# Patient Record
Sex: MALE | HISPANIC OR LATINO | Employment: FULL TIME | URBAN - METROPOLITAN AREA
[De-identification: names, ages, dates, MRNs, and addresses within clinical notes are randomized per-mention and may not be internally consistent; named-entity substitution may affect disease eponyms.]

---

## 2017-06-30 ENCOUNTER — OFFICE VISIT (OUTPATIENT)
Dept: URGENT CARE | Facility: PHYSICIAN GROUP | Age: 52
End: 2017-06-30
Payer: COMMERCIAL

## 2017-06-30 VITALS
OXYGEN SATURATION: 96 % | HEIGHT: 65 IN | TEMPERATURE: 98.2 F | HEART RATE: 58 BPM | WEIGHT: 146 LBS | BODY MASS INDEX: 24.32 KG/M2 | RESPIRATION RATE: 12 BRPM | SYSTOLIC BLOOD PRESSURE: 128 MMHG | DIASTOLIC BLOOD PRESSURE: 76 MMHG

## 2017-06-30 DIAGNOSIS — S76.311A RIGHT HAMSTRING MUSCLE STRAIN, INITIAL ENCOUNTER: ICD-10-CM

## 2017-06-30 PROCEDURE — 99203 OFFICE O/P NEW LOW 30 MIN: CPT | Performed by: FAMILY MEDICINE

## 2017-06-30 NOTE — MR AVS SNAPSHOT
"        Jamieshelby Powellr   2017 1:15 PM   Office Visit   MRN: [id number]    Department:  [facility]   Dept Phone:  [phone]    Description:  Male : 1965   Provider:  Aidan Núñez M.D.           Reason for Visit     Leg Pain right upper leg injury x 2 days, felt somthing pop in the back of his thigh and now red and painful       Allergies as of 2017     No Known Allergies      You were diagnosed with     Right hamstring muscle strain, initial encounter   [[id number]]         Vital Signs     Blood Pressure Pulse Temperature Respirations Height Weight    128/76 mmHg 58 36.8 °C (98.2 °F) 12 1.651 m (5' 5\") 66.225 kg (146 lb)    Body Mass Index Oxygen Saturation                24.30 kg/m2 96%          Basic Information     Date Of Birth Sex Race Ethnicity Preferred Language    1965 Male  or  Unknown English      Health Maintenance        Date Due Completion Dates    IMM DTaP/Tdap/Td Vaccine (1 - Tdap) 4/15/1985 ---    COLONOSCOPY 4/15/2016 ---            Current Immunizations     No immunizations on file.      Below and/or attached are the medications your provider expects you to take. Review all of your home medications and newly ordered medications with your provider and/or pharmacist. Follow medication instructions as directed by your provider and/or pharmacist. Please keep your medication list with you and share with your provider. Update the information when medications are discontinued, doses are changed, or new medications (including over-the-counter products) are added; and carry medication information at all times in the event of emergency situations     Allergies:  No Known Allergies          Medications  Valid as of: 2017 -  2:16 PM    Generic Name Brand Name Tablet Size Instructions for use    .                 Medicines prescribed today were sent to:     [id number] - [place], [place] - [address]    [address] [place] [address] [place] [address]   " Phone: [phone] Fax: [phone]    Open 24 Hours?: No      Medication refill instructions:       If your prescription bottle indicates you have medication refills left, it is not necessary to call your provider’s office. Please contact your pharmacy and they will refill your medication.    If your prescription bottle indicates you do not have any refills left, you may request refills at any time through one of the following ways: The online [employer] system (except Urgent Care), by calling your provider’s office, or by asking your pharmacy to contact your provider’s office with a refill request. Medication refills are processed only during regular business hours and may not be available until the next business day. Your provider may request additional information or to have a follow-up visit with you prior to refilling your medication.   *Please Note: Medication refills are assigned a new Rx number when refilled electronically. Your pharmacy may indicate that no refills were authorized even though a new prescription for the same medication is available at the pharmacy. Please request the medicine by name with the pharmacy before contacting your provider for a refill.           [employer] Access Code: [email or web address]  Expires: 7/18/2017  4:12 AM    [employer]mium  A secure, online tool to manage your health information     [employer]’s Shopmium® is a secure, online tool that connects you to your personalized health information from the privacy of your home -- day or night - making it very easy for you to manage your healthcare. Once the activation process is completed, you can even access your medical information using the Shopmium mike, which is available for free in the Apple Mike store or Google Play store.     Shopmium provides the following levels of access (as shown below):   My Chart Features   [facility] Doctor [facility]  Specialists [facility]  Urgent  Care [facility]  Primary Care  Doctor   Email your  healthcare team securely and privately 24/7 X X X    Manage appointments: schedule your next appointment; view details of past/upcoming appointments X      Request prescription refills. X      View recent personal medical records, including lab and immunizations X X X X   View health record, including health history, allergies, medications X X X X   Read reports about your outpatient visits, procedures, consult and ER notes X X X X   See your discharge summary, which is a recap of your hospital and/or ER visit that includes your diagnosis, lab results, and care plan. X X       How to register for [employer]:  1. Go to  [email or web address].  2. Click on the Sign Up Now box, which takes you to the New Member Sign Up page. You will need to provide the following information:  a. Enter your [employer] Access Code exactly as it appears at the top of this page. (You will not need to use this code after you’ve completed the sign-up process. If you do not sign up before the expiration date, you must request a new code.)   b. Enter your date of birth.   c. Enter your home email address.   d. Click Submit, and follow the next screen’s instructions.  3. Create a [employer] ID. This will be your [employer] login ID and cannot be changed, so think of one that is secure and easy to remember.  4. Create a [employer] password. You can change your password at any time.  5. Enter your Password Reset Question and Answer. This can be used at a later time if you forget your password.   6. Enter your e-mail address. This allows you to receive e-mail notifications when new information is available in [employer].  7. Click Sign Up. You can now view your health information.    For assistance activating your [employer] account, call [phone]        Quit Tobacco Information     Do you want to quit using tobacco?    Quitting tobacco decreases risks of cancer, heart and lung disease, increases life expectancy, improves sense of taste and smell, and  increases spending money, among other benefits.    If you are thinking about quitting, we can help.  • [facility] Quit Tobacco Program: 826.675.1318  o Program occurs weekly for four weeks and includes pharmacist consultation on products to support quitting smoking or chewing tobacco. A provider referral is needed for pharmacist consultation.  • Tobacco Users Help Hotline: 5-170-QUIT-NOW (654-4416) or https://[place].quitlogix.org/  o Free, confidential telephone and online coaching for [place] residents. Sessions are designed on a schedule that is convenient for you. Eligible clients receive free nicotine replacement therapy.  • Nationally: www.smokefree.gov  o Information and professional assistance to support both immediate and long-term needs as you become, and remain, a non-smoker. Smokefree.gov allows you to choose the help that best fits your needs.

## 2017-06-30 NOTE — PROGRESS NOTES
Chief Complaint:    Chief Complaint   Patient presents with   • Leg Pain     right upper leg injury x 2 days, felt somthing pop in the back of his thigh and now red and painful        History of Present Illness:    This is a new problem. Has pain in right mid hamstring region. On 6/28/17, he was using a satish to move objects. He felt a pop and pain in right mid hamstring region. There is large bruising now in inferior aspect of right hamstring and right posterior knee region. He put ice to area and took Tylenol. Reports pain is improved today compared to yesterday. Pain can be as high as 6/10 severity today whereas yesterday it could be 8/10 severity. He thought he was OK but son wanted him to be seen.      Review of Systems:    Constitutional: Negative for fever, chills, and diaphoresis.   Eyes: Negative for change in vision, photophobia, pain, redness, and discharge.  ENT: Negative for ear pain, ear discharge, hearing loss, tinnitus, nasal congestion, nosebleeds, and sore throat.    Respiratory: Negative for cough, hemoptysis, sputum production, shortness of breath, wheezing, and stridor.    Cardiovascular: Negative for chest pain, palpitations, orthopnea, claudication, leg swelling, and PND.   Gastrointestinal: Negative for abdominal pain, nausea, vomiting, diarrhea, constipation, blood in stool, and melena.    Musculoskeletal: See HPI.   Skin: Negative for rash and itching.   Neurological: Negative for dizziness, tingling, tremors, sensory change, speech change, focal weakness, seizures, loss of consciousness, and headaches.        Past Medical History:    History reviewed. No pertinent past medical history.    Past Surgical History:    Past Surgical History   Procedure Laterality Date   • Vasectomy         Social History:    Social History     Social History   • Marital Status:      Spouse Name: N/A   • Number of Children: 2   • Years of Education: N/A     Occupational History   • technician [employer]     Social History Main Topics   • Smoking status: Current Every Day Smoker   • Smokeless tobacco: Not on file      Comment: 1-2 cig/day since age 25    • Alcohol Use: Yes      Comment: 6-10 beers weekends   • Drug Use: No   • Sexual Activity:     Partners: Female     Other Topics Concern   • Not on file     Social History Narrative       Family History:    Family History   Problem Relation Age of Onset   • Hyperlipidemia Mother    • Clotting Disorder Mother      DVT leg   • Heart Disease Father      MI at age 66, s/p CABG   • Diabetes Father    • Hyperlipidemia Father        Medications:    No current outpatient prescriptions on file prior to visit.     No current facility-administered medications on file prior to visit.       Allergies:    No Known Allergies      Vitals:    Filed Vitals:    06/30/17 1339   BP: 128/76   Pulse: 58   Temp: 36.8 °C (98.2 °F)   Resp: 12   Height: 1.651 m (5' 5\")   Weight: 66.225 kg (146 lb)   SpO2: 96%       Physical Exam:    Constitutional: Vital signs reviewed. Appears well-developed and well-nourished. No acute distress.   Eyes: Sclera white, conjunctivae clear.  ENT: External ears normal. Hearing normal.  Cardiovascular: Peripheral pulses 2+.   Pulmonary/Chest: Respirations non-labored.  Musculoskeletal: Large bruising right inferior hamstring region and right posterior knee region. Normal gait. Normal range of motion. No tenderness to palpation. No muscular atrophy or weakness. Good strength throughout right leg, but hurts in right hamstring on resisted flexion at right knee.  Neurological: Alert and oriented to person, place, and time. Muscle tone normal. Coordination normal. Light touch and sensation normal.   Skin: No rashes or lesions. Warm, dry, normal turgor.      Assessment / Plan:    1. Right hamstring muscle strain, initial encounter      Discussed with him DDX and management options.    Hamstring Strain info given and discussed.    This can " "be managed conservatively as he still has normal range of motion and strength throughout and pain is improved today compared to yesterday.    Bruising will slowly self-resolve.    Recommended relative rest.     May use ice intermittently to affected area prn pain and swelling.    6\" ACE wrap provided to use prn pain and swelling.    May take over-the-counter Ibuprofen (Motrin or Advil) OR Naproxen (Aleve) as needed for pain and swelling for anti-inflammatory effect.    May take over-the-counter Acetaminophen (Tylenol) as needed for pain.    Follow-up with PCP or urgent care if getting worse or not better with time and above.      "

## 2019-08-06 ENCOUNTER — HOSPITAL ENCOUNTER (EMERGENCY)
Facility: MEDICAL CENTER | Age: 54
End: 2019-08-06
Attending: EMERGENCY MEDICINE
Payer: COMMERCIAL

## 2019-08-06 VITALS
BODY MASS INDEX: 23.49 KG/M2 | WEIGHT: 141 LBS | DIASTOLIC BLOOD PRESSURE: 76 MMHG | RESPIRATION RATE: 16 BRPM | SYSTOLIC BLOOD PRESSURE: 131 MMHG | HEART RATE: 74 BPM | HEIGHT: 65 IN

## 2019-08-06 DIAGNOSIS — F43.21 SITUATIONAL DEPRESSION: ICD-10-CM

## 2019-08-06 LAB — POC BREATHALIZER: 0 PERCENT (ref 0–0.01)

## 2019-08-06 PROCEDURE — 99285 EMERGENCY DEPT VISIT HI MDM: CPT

## 2019-08-06 PROCEDURE — 302970 POC BREATHALIZER: Performed by: EMERGENCY MEDICINE

## 2019-08-06 PROCEDURE — 90791 PSYCH DIAGNOSTIC EVALUATION: CPT

## 2019-08-06 NOTE — ED PROVIDER NOTES
ED Provider Note    Scribed for Zoe Morris M.D. by Rafiq Iverson. 8/6/2019, 12:04 PM.    Primary care provider: Jas Knutson M.D.  Means of arrival: EMS  History obtained from: patient  History limited by: none    CHIEF COMPLAINT  Chief Complaint   Patient presents with   • Suicidal Ideation     pt was in an argument with his wife. pt pulled out his gun case and said that he was going to shoot himself. per EMS he didnt open the case. [place] [employer] arrived and stated he should go to the ED for an eval. Not on a Legal 2000 from police. pt states it was just something he did in the moment. curently denies SI or HI.        HPI  Jamie Figueroa is a 54 y.o. male who presents to the Emergency Department for evaluation of suicidal ideation and complaining of depression. Patient is reported to have gotten into an argument with his wife over possible infidelity when he pulled out his gun case and stated that he was going to shoot himself. Police and EMS were called and the patient was recommended to come to the ED for a psych evaluation, but is not on a legal hold at this time. Patient states that he does not currently have suicidal ideation and does not have a plan to complete a suicide attempt. He states that he is looking forward to returning home to his family and his business and has already been told by his wife that she is willing to forgive him. Patient denies homicidal ideation, drug use, history of depression, history of suicidal ideation.  Patient states pulling at the gun was very impulsive.  He has not had any previous episodes of suicidal thoughts or ideation and he has no homicidal ideation    REVIEW OF SYSTEMS  Pertinent positives include suicidal ideation, depression. Pertinent negatives include no homicidal ideation, drug use, history of depression, history of suicidal ideation. All other systems reviewed and negative.     PAST MEDICAL HISTORY   No history of suicidal ideation    SURGICAL  "HISTORY   has a past surgical history that includes vasectomy.    SOCIAL HISTORY  Social History     Tobacco Use   • Smoking status: Current Every Day Smoker   • Smokeless tobacco: Never Used   • Tobacco comment: 1-2 cig/day since age 25    Substance Use Topics   • Alcohol use: Yes     Comment: 6-10 beers weekends   • Drug use: No      Social History     Substance and Sexual Activity   Drug Use No       FAMILY HISTORY  Family History   Problem Relation Age of Onset   • Hyperlipidemia Mother    • Clotting Disorder Mother         DVT leg   • Heart Disease Father         MI at age 66, s/p CABG   • Diabetes Father    • Hyperlipidemia Father        CURRENT MEDICATIONS  Home Medications     Reviewed by Jose Bryant R.N. (Registered Nurse) on 08/06/19 at 1156  Med List Status: Not Addressed   Medication Last Dose Status        Patient Andre Taking any Medications                       ALLERGIES  No Known Allergies    PHYSICAL EXAM  VITAL SIGNS: /90   Pulse 79   Resp 16   Ht 1.651 m (5' 5\")   Wt 64 kg (141 lb)   BMI 23.46 kg/m²     Constitutional:  Alert. Speaking in full sentences.   HENT: Normocephalic, Bilateral external ears normal. Nose normal.   Eyes: Pupils are equal and reactive. Conjunctiva normal, non-icteric.   Thorax & Lungs: Easy unlabored respirations  Abdomen:  No gross signs of peritonitis, no pain with movement   Skin: Visualized skin is  Dry, No erythema, No rash.   Extremities:   No edema, No asymmetry  Neurologic: Alert, Grossly non-focal.   Psychiatric: Anxious, tearful. Patient reports nosuicidal ideation.   Cardiovascular: Normal rate and regular rhythm.                                                   DIAGNOSTIC STUDIES / PROCEDURES\    LABS  Results for orders placed or performed during the hospital encounter of 08/06/19   POC BREATHALIZER   Result Value Ref Range    POC Breathalizer 0.001 0.00 - 0.01 Percent   All labs reviewed by me.    COURSE & MEDICAL DECISION MAKING  Nursing " "notes, VS, PMSFHx reviewed in chart.     Patient presents for evaluation after expressing some suicidal thoughts to his wife.  Patient states his action earlier today was extremely spontaneous.  He denies having any suicidal or homicidal ideation.  He has no history of suicidal or homicidal ideations in the past.  He has no history of psychiatric problems or depression.  Patient is upset here in the ER which seems appropriate.  He is tearful about the problems he is having with his wife.  However he is very forward thinking.  He is worried about his business as well as his daughter and granddaughter plan at this time is to obtain a psychiatric evaluation.    12:04 PM Patient seen and examined at bedside. Ordered for POC breathalyzer, Urine drug screen to evaluate.     1:26 PM - Patient has been consulted with [facility] who has cleared them for discharge. Patient's suicidal ideation is resolved at this time and a good plan has been established for outpatient care.  He is able to contract for safety.    1:50 PM - Recheck: Patient re-evaluated at beside. Patient reports feeling improved and continues to not have SI. Discussed patient's condition and treatment plan. Patient will be discharged with instructions and provided with strict return precautions. He is looking forward to returning home and reconciling with his wife. Patient has been provided with these written instructions and strict return precautions for any new or worsening symptoms. They are agreeable to this course of treatment and discharge.     Discharge vitals: /76   Pulse 74   Resp 16   Ht 1.651 m (5' 5\")   Wt 64 kg (141 lb)   BMI 23.46 kg/m²       The patient will return for new or worsening symptoms and is stable at the time of discharge.    The patient is referred to a primary physician for blood pressure management, diabetic screening, and for all other preventative health concerns.    DISPOSITION:  Patient will be discharged home in " stable condition.    FOLLOW UP:  Jas Knutson M.D.  [address]  [place] [address] [phone]    In 2 days  If symptoms worsen, return to the er.      FINAL IMPRESSION  1. Situational depression          Rafiq HOLLEY (Scribe), am scribing for, and in the presence of, Zoe Morris M.D..    Electronically signed by: Rafiq Iverson (Scribe), 8/6/2019    IZoe M.D. personally performed the services described in this documentation, as scribed by Rafqi Iverson in my presence, and it is both accurate and complete. C    The note accurately reflects work and decisions made by me.  Zoe Morris  8/6/2019  4:33 PM

## 2019-08-06 NOTE — DISCHARGE INSTRUCTIONS
If you have any new or different symptoms or concerns return to the ER.  Try to follow-up with your doctor or a therapist for further help.

## 2019-08-06 NOTE — ED NOTES
Chief Complaint   Patient presents with   • Suicidal Ideation     pt was in an argument with his wife. pt pulled out his gun case and said that he was going to shoot himself. per EMS he didnt open the case. [facility] arrived and stated he should go to the ED for an eval. Not on a Legal 2000 from police. pt states it was just something he did in the moment. curently denies SI or HI.      Pt changed into hospital gown. Belongings at nurses station. Pt currently denies SI and HI. Chart up for ERP.

## 2019-08-06 NOTE — CONSULTS
"[facility]   TRIAGE ASSESSMENT    Name: Jamie Figueroa  MRN: [id number]  : 1965  Age: 54 y.o.  Date of assessment: 2019  PCP: Jas Knutson M.D.  Persons in attendance: Patient    CHIEF COMPLAINT/PRESENTING ISSUE (as stated by pt): see below   Chief Complaint   Patient presents with   • Suicidal Ideation     pt was in an argument with his wife. pt pulled out his gun case and said that he was going to shoot himself. per EMS he didnt open the case. [place] [employer] arrived and stated he should go to the ED for an eval. Not on a Legal 2000 from police. pt states it was just something he did in the moment. curently denies SI or HI.       Pt denies any further SI; denies HI and hallucinations.  A+Ox4 and legally able to care for self.  He reports his wife thinks he is cheating d/t finding texts between him and another woman.  Pt says it is platonic, but wife did not know about it and was shocked.  Pt reports couple's conflicts in general, though they have been  27 years.  He says she is hard to talk to, leading him to talk to a stranger via text.  \"When we talk, I can forget about my finances, my relationship issues, my stress.\"        CURRENT LIVING SITUATION/SOCIAL SUPPORT: Pt lives with his wife and 2 grown children.  He reports adequate support between family and friends.    BEHAVIORAL HEALTH TREATMENT HISTORY  Does patient/parent report a history of prior behavioral health treatment for patient?   No:    SAFETY ASSESSMENT - SELF  Does patient acknowledge current or past symptoms of dangerousness to self? Yes, pt acknowledges voicing SI earlier today while arguing with his wife.  He denies any continuing SI and contracts for safety.  Does parent/significant other report patient has current or past symptoms of dangerousness to self? N\A  Does presenting problem suggest symptoms of dangerousness to self? No    SAFETY ASSESSMENT - OTHERS  Does patient acknowledge current or past symptoms " "of aggressive behavior or risk to others? no  Does parent/significant other report patient has current or past symptoms of aggressive behavior or risk to others?  N\A  Does presenting problem suggest symptoms of dangerousness to others? No    Crisis Safety Plan completed and copy given to patient? yes    ABUSE/NEGLECT SCREENING  Does patient report feeling “unsafe” in his/her home, or afraid of anyone?  no  Does patient report any history of physical, sexual, or emotional abuse?  no  Does parent or significant other report any of the above? no  Is there evidence of neglect by self?  no  Is there evidence of neglect by a caregiver? no  Does the patient/parent report any history of CPS/APS/police involvement related to suspected abuse/neglect or domestic violence? no  Based on the information provided during the current assessment, is a mandated report of suspected abuse/neglect being made?  No    SUBSTANCE USE SCREENING  Yes:  Dar all substances used in the past 30 days:      Last Use Amount   [x]   Alcohol 2 days ago Usually 2 nights a week   []   Marijuana     []   Heroin     []   Prescription Opioids  (used without prescription, for    recreation, or in excess of prescribed amount)     []   Other Prescription  (used without prescription, for    recreation, or in excess of prescribed amount)     []   Cocaine      []   Methamphetamine     []   \"\" drugs (ectasy, MDMA)     []   Other substances        UDS results: none  Breathalyzer results: 0.0    What consequences does the patient associate with any of the above substance use and or addictive behaviors? None    Risk factors for detox (check all that apply):  []  Seizures   []  Diaphoretic (sweating)   []  Tremors   []  Hallucinations   []  Increased blood pressure   []  Decreased blood pressure   []  Other   []  None      [] Patient education on risk factors for detoxification and instructed to return to ER as needed.      MENTAL STATUS   Participation: " Active verbal participation, Attentive, Engaged and Open to feedback  Grooming: Casual  Orientation: Alert and Fully Oriented  Behavior: Calm  Eye contact: Good  Mood: Depressed  Affect: Flexible, Full range, Congruent with content and Sad  Thought process: Logical and Goal-directed  Thought content: Within normal limits  Speech: Rate within normal limits and Volume within normal limits  Perception: Within normal limits  Memory:  No gross evidence of memory deficits  Insight: Adequate  Judgment:  Adequate  Other:    Collateral information: no past psych hx in our EMR  Source:  [] Significant other present in person:   [] Significant other by telephone  [] [facility]   [] [facility] Nursing Staff  [x] [facility]  [] Other:     [] Unable to complete full assessment due to:  [] Acute intoxication  [] Patient declined to participate/engage  [] Patient verbally unresponsive  [] Significant cognitive deficits  [] Significant perceptual distortions or behavioral disorganization  [] Other:      CLINICAL IMPRESSIONS:  Primary:  SI-resolved  Secondary:  Couple's conflict       IDENTIFIED NEEDS/PLAN:  [Trigger DISPOSITION list for any items marked]    []  Imminent safety risk - self [] Imminent safety risk - others   []  Acute substance withdrawal []  Psychosis/Impaired reality testing   [x]  Mood/anxiety []  Substance use/Addictive behavior   []  Maladaptive behaviro []  Parent/child conflict   [x]  Family/Couples conflict []  Biomedical   []  Housing []  Financial   []   Legal  Occupational/Educational   []  Domestic violence []  Other:     Disposition: Refer to therapy and psychiatry as needed.  Provided resource list and highlighted the different options available with his insurance.    Does patient express agreement with the above plan? yes    Referral appointment(s) scheduled? no    Alert team only: Pt to DC to home.  Denies SI, HI and hallucinations; able to care for self.  He is able to list multiple  reasons to live, including his children and his first grandchild on the way.  He exhibits future thinking.  Contracts for safety and advised to return immediately to ER should he feel unable to stay safe.  I have discussed findings and recommendations with Dr. Brown, who is in agreement with these recommendations.       Cristina Encinas R.N.  8/6/2019

## 2021-04-14 ENCOUNTER — IMMUNIZATION (OUTPATIENT)
Dept: FAMILY PLANNING/WOMEN'S HEALTH CLINIC | Facility: IMMUNIZATION CENTER | Age: 56
End: 2021-04-14
Payer: COMMERCIAL

## 2021-04-14 DIAGNOSIS — Z23 ENCOUNTER FOR VACCINATION: Primary | ICD-10-CM

## 2021-04-14 PROCEDURE — 91300 PFIZER SARS-COV-2 VACCINE: CPT

## 2021-04-14 PROCEDURE — 0001A PFIZER SARS-COV-2 VACCINE: CPT

## 2021-05-14 ENCOUNTER — IMMUNIZATION (OUTPATIENT)
Dept: FAMILY PLANNING/WOMEN'S HEALTH CLINIC | Facility: IMMUNIZATION CENTER | Age: 56
End: 2021-05-14
Attending: INTERNAL MEDICINE
Payer: COMMERCIAL

## 2021-05-14 DIAGNOSIS — Z23 ENCOUNTER FOR VACCINATION: Primary | ICD-10-CM

## 2021-05-14 PROCEDURE — 0002A PFIZER SARS-COV-2 VACCINE: CPT

## 2021-05-14 PROCEDURE — 91300 PFIZER SARS-COV-2 VACCINE: CPT

## 2022-11-08 PROCEDURE — 99291 CRITICAL CARE FIRST HOUR: CPT

## 2022-11-08 PROCEDURE — 36415 COLL VENOUS BLD VENIPUNCTURE: CPT

## 2022-11-08 ASSESSMENT — PAIN DESCRIPTION - DESCRIPTORS: DESCRIPTORS: PRESSURE

## 2022-11-09 ENCOUNTER — ANESTHESIA (OUTPATIENT)
Dept: SURGERY | Facility: MEDICAL CENTER | Age: 57
DRG: 418 | End: 2022-11-09
Payer: COMMERCIAL

## 2022-11-09 ENCOUNTER — HOSPITAL ENCOUNTER (INPATIENT)
Facility: MEDICAL CENTER | Age: 57
LOS: 1 days | DRG: 418 | End: 2022-11-10
Attending: EMERGENCY MEDICINE | Admitting: SURGERY
Payer: COMMERCIAL

## 2022-11-09 ENCOUNTER — ANESTHESIA EVENT (OUTPATIENT)
Dept: SURGERY | Facility: MEDICAL CENTER | Age: 57
DRG: 418 | End: 2022-11-09
Payer: COMMERCIAL

## 2022-11-09 ENCOUNTER — APPOINTMENT (OUTPATIENT)
Dept: RADIOLOGY | Facility: MEDICAL CENTER | Age: 57
DRG: 418 | End: 2022-11-09
Attending: EMERGENCY MEDICINE
Payer: COMMERCIAL

## 2022-11-09 DIAGNOSIS — K81.0 ACUTE CHOLECYSTITIS: ICD-10-CM

## 2022-11-09 PROBLEM — K81.9 CHOLECYSTITIS: Status: ACTIVE | Noted: 2022-11-09

## 2022-11-09 LAB
ALBUMIN SERPL BCP-MCNC: 4.9 G/DL (ref 3.2–4.9)
ALBUMIN/GLOB SERPL: 1.5 G/DL
ALP SERPL-CCNC: 98 U/L (ref 30–99)
ALT SERPL-CCNC: 29 U/L (ref 2–50)
ANION GAP SERPL CALC-SCNC: 14 MMOL/L (ref 7–16)
APPEARANCE UR: CLEAR
AST SERPL-CCNC: 27 U/L (ref 12–45)
BASOPHILS # BLD AUTO: 0.3 % (ref 0–1.8)
BASOPHILS # BLD: 0.04 K/UL (ref 0–0.12)
BILIRUB SERPL-MCNC: 0.6 MG/DL (ref 0.1–1.5)
BILIRUB UR QL STRIP.AUTO: NEGATIVE
BUN SERPL-MCNC: 13 MG/DL (ref 8–22)
CALCIUM SERPL-MCNC: 9.6 MG/DL (ref 8.5–10.5)
CHLORIDE SERPL-SCNC: 98 MMOL/L (ref 96–112)
CO2 SERPL-SCNC: 25 MMOL/L (ref 20–33)
COLOR UR: YELLOW
CREAT SERPL-MCNC: 0.62 MG/DL (ref 0.5–1.4)
EOSINOPHIL # BLD AUTO: 0.02 K/UL (ref 0–0.51)
EOSINOPHIL NFR BLD: 0.2 % (ref 0–6.9)
ERYTHROCYTE [DISTWIDTH] IN BLOOD BY AUTOMATED COUNT: 40.7 FL (ref 35.9–50)
GFR SERPLBLD CREATININE-BSD FMLA CKD-EPI: 112 ML/MIN/1.73 M 2
GLOBULIN SER CALC-MCNC: 3.2 G/DL (ref 1.9–3.5)
GLUCOSE SERPL-MCNC: 119 MG/DL (ref 65–99)
GLUCOSE UR STRIP.AUTO-MCNC: NEGATIVE MG/DL
HCT VFR BLD AUTO: 47.8 % (ref 42–52)
HGB BLD-MCNC: 16.7 G/DL (ref 14–18)
IMM GRANULOCYTES # BLD AUTO: 0.06 K/UL (ref 0–0.11)
IMM GRANULOCYTES NFR BLD AUTO: 0.5 % (ref 0–0.9)
KETONES UR STRIP.AUTO-MCNC: 15 MG/DL
LEUKOCYTE ESTERASE UR QL STRIP.AUTO: NEGATIVE
LIPASE SERPL-CCNC: 23 U/L (ref 11–82)
LYMPHOCYTES # BLD AUTO: 0.99 K/UL (ref 1–4.8)
LYMPHOCYTES NFR BLD: 8.1 % (ref 22–41)
MCH RBC QN AUTO: 31.2 PG (ref 27–33)
MCHC RBC AUTO-ENTMCNC: 34.9 G/DL (ref 33.7–35.3)
MCV RBC AUTO: 89.3 FL (ref 81.4–97.8)
MICRO URNS: ABNORMAL
MONOCYTES # BLD AUTO: 0.49 K/UL (ref 0–0.85)
MONOCYTES NFR BLD AUTO: 4 % (ref 0–13.4)
NEUTROPHILS # BLD AUTO: 10.67 K/UL (ref 1.82–7.42)
NEUTROPHILS NFR BLD: 86.9 % (ref 44–72)
NITRITE UR QL STRIP.AUTO: NEGATIVE
NRBC # BLD AUTO: 0 K/UL
NRBC BLD-RTO: 0 /100 WBC
PH UR STRIP.AUTO: 8 [PH] (ref 5–8)
PLATELET # BLD AUTO: 189 K/UL (ref 164–446)
PMV BLD AUTO: 9.4 FL (ref 9–12.9)
POTASSIUM SERPL-SCNC: 4.5 MMOL/L (ref 3.6–5.5)
PROT SERPL-MCNC: 8.1 G/DL (ref 6–8.2)
PROT UR QL STRIP: NEGATIVE MG/DL
RBC # BLD AUTO: 5.35 M/UL (ref 4.7–6.1)
RBC UR QL AUTO: NEGATIVE
SODIUM SERPL-SCNC: 137 MMOL/L (ref 135–145)
SP GR UR STRIP.AUTO: 1.02
UROBILINOGEN UR STRIP.AUTO-MCNC: 1 MG/DL
WBC # BLD AUTO: 12.3 K/UL (ref 4.8–10.8)

## 2022-11-09 PROCEDURE — 47562 LAPAROSCOPIC CHOLECYSTECTOMY: CPT | Performed by: SURGERY

## 2022-11-09 PROCEDURE — 700105 HCHG RX REV CODE 258: Performed by: EMERGENCY MEDICINE

## 2022-11-09 PROCEDURE — 160009 HCHG ANES TIME/MIN: Performed by: SURGERY

## 2022-11-09 PROCEDURE — 96365 THER/PROPH/DIAG IV INF INIT: CPT

## 2022-11-09 PROCEDURE — 700101 HCHG RX REV CODE 250: Performed by: EMERGENCY MEDICINE

## 2022-11-09 PROCEDURE — 81003 URINALYSIS AUTO W/O SCOPE: CPT

## 2022-11-09 PROCEDURE — 0FT44ZZ RESECTION OF GALLBLADDER, PERCUTANEOUS ENDOSCOPIC APPROACH: ICD-10-PCS | Performed by: SURGERY

## 2022-11-09 PROCEDURE — 700111 HCHG RX REV CODE 636 W/ 250 OVERRIDE (IP): Performed by: SURGERY

## 2022-11-09 PROCEDURE — 87040 BLOOD CULTURE FOR BACTERIA: CPT

## 2022-11-09 PROCEDURE — 160041 HCHG SURGERY MINUTES - EA ADDL 1 MIN LEVEL 4: Performed by: SURGERY

## 2022-11-09 PROCEDURE — 160048 HCHG OR STATISTICAL LEVEL 1-5: Performed by: SURGERY

## 2022-11-09 PROCEDURE — 96375 TX/PRO/DX INJ NEW DRUG ADDON: CPT

## 2022-11-09 PROCEDURE — 80053 COMPREHEN METABOLIC PANEL: CPT

## 2022-11-09 PROCEDURE — 93005 ELECTROCARDIOGRAM TRACING: CPT | Performed by: SURGERY

## 2022-11-09 PROCEDURE — 93005 ELECTROCARDIOGRAM TRACING: CPT | Performed by: EMERGENCY MEDICINE

## 2022-11-09 PROCEDURE — 85025 COMPLETE CBC W/AUTO DIFF WBC: CPT

## 2022-11-09 PROCEDURE — 700111 HCHG RX REV CODE 636 W/ 250 OVERRIDE (IP): Performed by: EMERGENCY MEDICINE

## 2022-11-09 PROCEDURE — 99223 1ST HOSP IP/OBS HIGH 75: CPT | Performed by: SURGERY

## 2022-11-09 PROCEDURE — 770001 HCHG ROOM/CARE - MED/SURG/GYN PRIV*

## 2022-11-09 PROCEDURE — A9270 NON-COVERED ITEM OR SERVICE: HCPCS | Performed by: EMERGENCY MEDICINE

## 2022-11-09 PROCEDURE — 160029 HCHG SURGERY MINUTES - 1ST 30 MINS LEVEL 4: Performed by: SURGERY

## 2022-11-09 PROCEDURE — 160036 HCHG PACU - EA ADDL 30 MINS PHASE I: Performed by: SURGERY

## 2022-11-09 PROCEDURE — A9270 NON-COVERED ITEM OR SERVICE: HCPCS | Performed by: SURGERY

## 2022-11-09 PROCEDURE — 700102 HCHG RX REV CODE 250 W/ 637 OVERRIDE(OP): Performed by: SURGERY

## 2022-11-09 PROCEDURE — 160002 HCHG RECOVERY MINUTES (STAT): Performed by: SURGERY

## 2022-11-09 PROCEDURE — 88304 TISSUE EXAM BY PATHOLOGIST: CPT

## 2022-11-09 PROCEDURE — 76705 ECHO EXAM OF ABDOMEN: CPT

## 2022-11-09 PROCEDURE — 36415 COLL VENOUS BLD VENIPUNCTURE: CPT

## 2022-11-09 PROCEDURE — 700102 HCHG RX REV CODE 250 W/ 637 OVERRIDE(OP): Performed by: EMERGENCY MEDICINE

## 2022-11-09 PROCEDURE — 83690 ASSAY OF LIPASE: CPT

## 2022-11-09 PROCEDURE — 00790 ANES IPER UPR ABD NOS: CPT | Performed by: EMERGENCY MEDICINE

## 2022-11-09 PROCEDURE — 160035 HCHG PACU - 1ST 60 MINS PHASE I: Performed by: SURGERY

## 2022-11-09 PROCEDURE — 700101 HCHG RX REV CODE 250: Performed by: SURGERY

## 2022-11-09 RX ORDER — HYDROMORPHONE HYDROCHLORIDE 1 MG/ML
0.1 INJECTION, SOLUTION INTRAMUSCULAR; INTRAVENOUS; SUBCUTANEOUS
Status: DISCONTINUED | OUTPATIENT
Start: 2022-11-09 | End: 2022-11-09 | Stop reason: HOSPADM

## 2022-11-09 RX ORDER — ACETAMINOPHEN 325 MG/1
650 TABLET ORAL EVERY 6 HOURS PRN
Status: DISCONTINUED | OUTPATIENT
Start: 2022-11-14 | End: 2022-11-10 | Stop reason: HOSPADM

## 2022-11-09 RX ORDER — SODIUM CHLORIDE, SODIUM LACTATE, POTASSIUM CHLORIDE, CALCIUM CHLORIDE 600; 310; 30; 20 MG/100ML; MG/100ML; MG/100ML; MG/100ML
INJECTION, SOLUTION INTRAVENOUS
Status: DISCONTINUED | OUTPATIENT
Start: 2022-11-09 | End: 2022-11-09 | Stop reason: SURG

## 2022-11-09 RX ORDER — HYDRALAZINE HYDROCHLORIDE 20 MG/ML
5 INJECTION INTRAMUSCULAR; INTRAVENOUS
Status: DISCONTINUED | OUTPATIENT
Start: 2022-11-09 | End: 2022-11-09 | Stop reason: HOSPADM

## 2022-11-09 RX ORDER — HYDROMORPHONE HYDROCHLORIDE 2 MG/ML
INJECTION, SOLUTION INTRAMUSCULAR; INTRAVENOUS; SUBCUTANEOUS PRN
Status: DISCONTINUED | OUTPATIENT
Start: 2022-11-09 | End: 2022-11-09 | Stop reason: SURG

## 2022-11-09 RX ORDER — ONDANSETRON 2 MG/ML
4 INJECTION INTRAMUSCULAR; INTRAVENOUS
Status: COMPLETED | OUTPATIENT
Start: 2022-11-09 | End: 2022-11-09

## 2022-11-09 RX ORDER — ONDANSETRON 2 MG/ML
4 INJECTION INTRAMUSCULAR; INTRAVENOUS ONCE
Status: COMPLETED | OUTPATIENT
Start: 2022-11-09 | End: 2022-11-09

## 2022-11-09 RX ORDER — DOCUSATE SODIUM 100 MG/1
100 CAPSULE, LIQUID FILLED ORAL 2 TIMES DAILY
Status: DISCONTINUED | OUTPATIENT
Start: 2022-11-09 | End: 2022-11-10 | Stop reason: HOSPADM

## 2022-11-09 RX ORDER — OXYCODONE HYDROCHLORIDE AND ACETAMINOPHEN 5; 325 MG/1; MG/1
2 TABLET ORAL
Status: COMPLETED | OUTPATIENT
Start: 2022-11-09 | End: 2022-11-09

## 2022-11-09 RX ORDER — ONDANSETRON 2 MG/ML
INJECTION INTRAMUSCULAR; INTRAVENOUS PRN
Status: DISCONTINUED | OUTPATIENT
Start: 2022-11-09 | End: 2022-11-09 | Stop reason: SURG

## 2022-11-09 RX ORDER — ACETAMINOPHEN 500 MG
1000 TABLET ORAL
Status: ON HOLD | COMMUNITY
End: 2022-11-10

## 2022-11-09 RX ORDER — ENEMA 19; 7 G/133ML; G/133ML
1 ENEMA RECTAL
Status: DISCONTINUED | OUTPATIENT
Start: 2022-11-09 | End: 2022-11-10 | Stop reason: HOSPADM

## 2022-11-09 RX ORDER — HYDROMORPHONE HYDROCHLORIDE 1 MG/ML
0.4 INJECTION, SOLUTION INTRAMUSCULAR; INTRAVENOUS; SUBCUTANEOUS
Status: DISCONTINUED | OUTPATIENT
Start: 2022-11-09 | End: 2022-11-09 | Stop reason: HOSPADM

## 2022-11-09 RX ORDER — ROCURONIUM BROMIDE 10 MG/ML
INJECTION, SOLUTION INTRAVENOUS PRN
Status: DISCONTINUED | OUTPATIENT
Start: 2022-11-09 | End: 2022-11-09 | Stop reason: SURG

## 2022-11-09 RX ORDER — ACETAMINOPHEN 325 MG/1
650 TABLET ORAL EVERY 6 HOURS
Status: DISCONTINUED | OUTPATIENT
Start: 2022-11-09 | End: 2022-11-10 | Stop reason: HOSPADM

## 2022-11-09 RX ORDER — KETOROLAC TROMETHAMINE 30 MG/ML
15 INJECTION, SOLUTION INTRAMUSCULAR; INTRAVENOUS ONCE
Status: COMPLETED | OUTPATIENT
Start: 2022-11-09 | End: 2022-11-09

## 2022-11-09 RX ORDER — LABETALOL HYDROCHLORIDE 5 MG/ML
5 INJECTION, SOLUTION INTRAVENOUS
Status: DISCONTINUED | OUTPATIENT
Start: 2022-11-09 | End: 2022-11-09 | Stop reason: HOSPADM

## 2022-11-09 RX ORDER — ONDANSETRON 2 MG/ML
4 INJECTION INTRAMUSCULAR; INTRAVENOUS EVERY 4 HOURS PRN
Status: DISCONTINUED | OUTPATIENT
Start: 2022-11-09 | End: 2022-11-10 | Stop reason: HOSPADM

## 2022-11-09 RX ORDER — MIDAZOLAM HYDROCHLORIDE 1 MG/ML
INJECTION INTRAMUSCULAR; INTRAVENOUS PRN
Status: DISCONTINUED | OUTPATIENT
Start: 2022-11-09 | End: 2022-11-09 | Stop reason: SURG

## 2022-11-09 RX ORDER — ALUMINA, MAGNESIA, AND SIMETHICONE 2400; 2400; 240 MG/30ML; MG/30ML; MG/30ML
20 SUSPENSION ORAL ONCE
Status: COMPLETED | OUTPATIENT
Start: 2022-11-09 | End: 2022-11-09

## 2022-11-09 RX ORDER — LIDOCAINE HYDROCHLORIDE 20 MG/ML
INJECTION, SOLUTION EPIDURAL; INFILTRATION; INTRACAUDAL; PERINEURAL PRN
Status: DISCONTINUED | OUTPATIENT
Start: 2022-11-09 | End: 2022-11-09 | Stop reason: SURG

## 2022-11-09 RX ORDER — SODIUM CHLORIDE, SODIUM LACTATE, POTASSIUM CHLORIDE, AND CALCIUM CHLORIDE .6; .31; .03; .02 G/100ML; G/100ML; G/100ML; G/100ML
500 INJECTION, SOLUTION INTRAVENOUS
Status: DISCONTINUED | OUTPATIENT
Start: 2022-11-09 | End: 2022-11-09 | Stop reason: HOSPADM

## 2022-11-09 RX ORDER — BUPIVACAINE HYDROCHLORIDE AND EPINEPHRINE 5; 5 MG/ML; UG/ML
INJECTION, SOLUTION EPIDURAL; INTRACAUDAL; PERINEURAL
Status: DISCONTINUED | OUTPATIENT
Start: 2022-11-09 | End: 2022-11-09 | Stop reason: HOSPADM

## 2022-11-09 RX ORDER — AMOXICILLIN 250 MG
1 CAPSULE ORAL NIGHTLY
Status: DISCONTINUED | OUTPATIENT
Start: 2022-11-09 | End: 2022-11-10 | Stop reason: HOSPADM

## 2022-11-09 RX ORDER — AMOXICILLIN 250 MG
1 CAPSULE ORAL
Status: DISCONTINUED | OUTPATIENT
Start: 2022-11-09 | End: 2022-11-10 | Stop reason: HOSPADM

## 2022-11-09 RX ORDER — MEPERIDINE HYDROCHLORIDE 25 MG/ML
6.25 INJECTION INTRAMUSCULAR; INTRAVENOUS; SUBCUTANEOUS
Status: DISCONTINUED | OUTPATIENT
Start: 2022-11-09 | End: 2022-11-09 | Stop reason: HOSPADM

## 2022-11-09 RX ORDER — DEXAMETHASONE SODIUM PHOSPHATE 4 MG/ML
INJECTION, SOLUTION INTRA-ARTICULAR; INTRALESIONAL; INTRAMUSCULAR; INTRAVENOUS; SOFT TISSUE PRN
Status: DISCONTINUED | OUTPATIENT
Start: 2022-11-09 | End: 2022-11-09 | Stop reason: SURG

## 2022-11-09 RX ORDER — DIPHENHYDRAMINE HYDROCHLORIDE 50 MG/ML
12.5 INJECTION INTRAMUSCULAR; INTRAVENOUS
Status: DISCONTINUED | OUTPATIENT
Start: 2022-11-09 | End: 2022-11-09 | Stop reason: HOSPADM

## 2022-11-09 RX ORDER — HYDROMORPHONE HYDROCHLORIDE 1 MG/ML
0.2 INJECTION, SOLUTION INTRAMUSCULAR; INTRAVENOUS; SUBCUTANEOUS
Status: DISCONTINUED | OUTPATIENT
Start: 2022-11-09 | End: 2022-11-09 | Stop reason: HOSPADM

## 2022-11-09 RX ORDER — ONDANSETRON 4 MG/1
4 TABLET, ORALLY DISINTEGRATING ORAL EVERY 4 HOURS PRN
Status: DISCONTINUED | OUTPATIENT
Start: 2022-11-09 | End: 2022-11-10 | Stop reason: HOSPADM

## 2022-11-09 RX ORDER — HYDROMORPHONE HYDROCHLORIDE 1 MG/ML
0.5 INJECTION, SOLUTION INTRAMUSCULAR; INTRAVENOUS; SUBCUTANEOUS
Status: DISCONTINUED | OUTPATIENT
Start: 2022-11-09 | End: 2022-11-10 | Stop reason: HOSPADM

## 2022-11-09 RX ORDER — ENOXAPARIN SODIUM 100 MG/ML
40 INJECTION SUBCUTANEOUS DAILY
Status: DISCONTINUED | OUTPATIENT
Start: 2022-11-10 | End: 2022-11-10 | Stop reason: HOSPADM

## 2022-11-09 RX ORDER — METRONIDAZOLE 500 MG/100ML
500 INJECTION, SOLUTION INTRAVENOUS EVERY 8 HOURS
Status: DISCONTINUED | OUTPATIENT
Start: 2022-11-09 | End: 2022-11-10 | Stop reason: HOSPADM

## 2022-11-09 RX ORDER — OXYCODONE HYDROCHLORIDE AND ACETAMINOPHEN 5; 325 MG/1; MG/1
1 TABLET ORAL
Status: COMPLETED | OUTPATIENT
Start: 2022-11-09 | End: 2022-11-09

## 2022-11-09 RX ORDER — OXYCODONE HYDROCHLORIDE 5 MG/1
10 TABLET ORAL
Status: DISCONTINUED | OUTPATIENT
Start: 2022-11-09 | End: 2022-11-10 | Stop reason: HOSPADM

## 2022-11-09 RX ORDER — CEFAZOLIN SODIUM 1 G/3ML
INJECTION, POWDER, FOR SOLUTION INTRAMUSCULAR; INTRAVENOUS PRN
Status: DISCONTINUED | OUTPATIENT
Start: 2022-11-09 | End: 2022-11-09 | Stop reason: SURG

## 2022-11-09 RX ORDER — POLYETHYLENE GLYCOL 3350 17 G/17G
1 POWDER, FOR SOLUTION ORAL 2 TIMES DAILY
Status: DISCONTINUED | OUTPATIENT
Start: 2022-11-09 | End: 2022-11-10 | Stop reason: HOSPADM

## 2022-11-09 RX ORDER — HALOPERIDOL 5 MG/ML
1 INJECTION INTRAMUSCULAR
Status: DISCONTINUED | OUTPATIENT
Start: 2022-11-09 | End: 2022-11-09 | Stop reason: HOSPADM

## 2022-11-09 RX ORDER — OXYCODONE HYDROCHLORIDE 5 MG/1
5 TABLET ORAL
Status: DISCONTINUED | OUTPATIENT
Start: 2022-11-09 | End: 2022-11-10 | Stop reason: HOSPADM

## 2022-11-09 RX ORDER — BISACODYL 10 MG
10 SUPPOSITORY, RECTAL RECTAL
Status: DISCONTINUED | OUTPATIENT
Start: 2022-11-09 | End: 2022-11-10 | Stop reason: HOSPADM

## 2022-11-09 RX ADMIN — MAGNESIUM HYDROXIDE 30 ML: 400 SUSPENSION ORAL at 16:09

## 2022-11-09 RX ADMIN — LIDOCAINE HYDROCHLORIDE 100 MG: 20 INJECTION, SOLUTION EPIDURAL; INFILTRATION; INTRACAUDAL at 09:13

## 2022-11-09 RX ADMIN — ONDANSETRON HYDROCHLORIDE 4 MG: 2 SOLUTION INTRAMUSCULAR; INTRAVENOUS at 02:51

## 2022-11-09 RX ADMIN — ALUMINUM HYDROXIDE, MAGNESIUM HYDROXIDE, AND DIMETHICONE 20 ML: 400; 400; 40 SUSPENSION ORAL at 02:45

## 2022-11-09 RX ADMIN — DOCUSATE SODIUM 100 MG: 100 CAPSULE, LIQUID FILLED ORAL at 16:09

## 2022-11-09 RX ADMIN — MIDAZOLAM HYDROCHLORIDE 2 MG: 1 INJECTION, SOLUTION INTRAMUSCULAR; INTRAVENOUS at 09:09

## 2022-11-09 RX ADMIN — METRONIDAZOLE 500 MG: 5 INJECTION, SOLUTION INTRAVENOUS at 16:17

## 2022-11-09 RX ADMIN — ROCURONIUM BROMIDE 10 MG: 10 INJECTION, SOLUTION INTRAVENOUS at 10:19

## 2022-11-09 RX ADMIN — SODIUM CHLORIDE, POTASSIUM CHLORIDE, SODIUM LACTATE AND CALCIUM CHLORIDE: 600; 310; 30; 20 INJECTION, SOLUTION INTRAVENOUS at 09:07

## 2022-11-09 RX ADMIN — KETOROLAC TROMETHAMINE 15 MG: 30 INJECTION, SOLUTION INTRAMUSCULAR; INTRAVENOUS at 02:51

## 2022-11-09 RX ADMIN — FENTANYL CITRATE 100 MCG: 50 INJECTION, SOLUTION INTRAMUSCULAR; INTRAVENOUS at 09:13

## 2022-11-09 RX ADMIN — SUGAMMADEX 200 MG: 100 INJECTION, SOLUTION INTRAVENOUS at 10:33

## 2022-11-09 RX ADMIN — ONDANSETRON 4 MG: 2 INJECTION INTRAMUSCULAR; INTRAVENOUS at 11:07

## 2022-11-09 RX ADMIN — METRONIDAZOLE 500 MG: 5 INJECTION, SOLUTION INTRAVENOUS at 22:02

## 2022-11-09 RX ADMIN — PIPERACILLIN AND TAZOBACTAM 4.5 G: 4; .5 INJECTION, POWDER, LYOPHILIZED, FOR SOLUTION INTRAVENOUS; PARENTERAL at 03:41

## 2022-11-09 RX ADMIN — CEFTRIAXONE SODIUM 1000 MG: 10 INJECTION, POWDER, FOR SOLUTION INTRAVENOUS at 16:09

## 2022-11-09 RX ADMIN — PROPOFOL 120 MG: 10 INJECTION, EMULSION INTRAVENOUS at 09:13

## 2022-11-09 RX ADMIN — DEXAMETHASONE SODIUM PHOSPHATE 4 MG: 4 INJECTION, SOLUTION INTRA-ARTICULAR; INTRALESIONAL; INTRAMUSCULAR; INTRAVENOUS; SOFT TISSUE at 09:25

## 2022-11-09 RX ADMIN — HYDROMORPHONE HYDROCHLORIDE 0.5 MG: 2 INJECTION INTRAMUSCULAR; INTRAVENOUS; SUBCUTANEOUS at 10:34

## 2022-11-09 RX ADMIN — CEFAZOLIN 2 G: 330 INJECTION, POWDER, FOR SOLUTION INTRAMUSCULAR; INTRAVENOUS at 09:20

## 2022-11-09 RX ADMIN — OXYCODONE AND ACETAMINOPHEN 2 TABLET: 5; 325 TABLET ORAL at 11:07

## 2022-11-09 RX ADMIN — ROCURONIUM BROMIDE 50 MG: 10 INJECTION, SOLUTION INTRAVENOUS at 09:13

## 2022-11-09 RX ADMIN — HYDROMORPHONE HYDROCHLORIDE 1 MG: 2 INJECTION INTRAMUSCULAR; INTRAVENOUS; SUBCUTANEOUS at 09:25

## 2022-11-09 RX ADMIN — ONDANSETRON 4 MG: 2 INJECTION INTRAMUSCULAR; INTRAVENOUS at 09:25

## 2022-11-09 RX ADMIN — ACETAMINOPHEN 650 MG: 325 TABLET, FILM COATED ORAL at 16:09

## 2022-11-09 ASSESSMENT — LIFESTYLE VARIABLES
TOTAL SCORE: 0
ON A TYPICAL DAY WHEN YOU DRINK ALCOHOL HOW MANY DRINKS DO YOU HAVE: 6
HOW MANY TIMES IN THE PAST YEAR HAVE YOU HAD 5 OR MORE DRINKS IN A DAY: 150
CONSUMPTION TOTAL: POSITIVE
TOTAL SCORE: 0
ALCOHOL_USE: YES
HAVE YOU EVER FELT YOU SHOULD CUT DOWN ON YOUR DRINKING: NO
EVER FELT BAD OR GUILTY ABOUT YOUR DRINKING: NO
EVER HAD A DRINK FIRST THING IN THE MORNING TO STEADY YOUR NERVES TO GET RID OF A HANGOVER: NO
AVERAGE NUMBER OF DAYS PER WEEK YOU HAVE A DRINK CONTAINING ALCOHOL: 3
DOES PATIENT WANT TO STOP DRINKING: NO
TOTAL SCORE: 0
HAVE PEOPLE ANNOYED YOU BY CRITICIZING YOUR DRINKING: NO

## 2022-11-09 ASSESSMENT — PAIN DESCRIPTION - PAIN TYPE
TYPE: SURGICAL PAIN

## 2022-11-09 ASSESSMENT — COGNITIVE AND FUNCTIONAL STATUS - GENERAL
SUGGESTED CMS G CODE MODIFIER MOBILITY: CH
DAILY ACTIVITIY SCORE: 24
MOBILITY SCORE: 24
SUGGESTED CMS G CODE MODIFIER DAILY ACTIVITY: CH

## 2022-11-09 ASSESSMENT — PATIENT HEALTH QUESTIONNAIRE - PHQ9
1. LITTLE INTEREST OR PLEASURE IN DOING THINGS: NOT AT ALL
2. FEELING DOWN, DEPRESSED, IRRITABLE, OR HOPELESS: NOT AT ALL
SUM OF ALL RESPONSES TO PHQ9 QUESTIONS 1 AND 2: 0

## 2022-11-09 ASSESSMENT — PAIN SCALES - GENERAL: PAIN_LEVEL: 4

## 2022-11-09 NOTE — ED NOTES
Med Rec Complete per patient  Allergies Reviewed with patient  No antibiotics within the last 30 days  Patient's Preferred Pharmacy:  [facility] [address].

## 2022-11-09 NOTE — ANESTHESIA PREPROCEDURE EVALUATION
Case: [id number] Date/Time: 11/09/22 0831    Procedure: CHOLECYSTECTOMY, LAPAROSCOPIC    Location: [facility] OR 14 / SURGERY [facility]    Surgeons: Maximilian Rojas D.O.          Relevant Problems   No relevant active problems       Physical Exam    Airway   Mallampati: II  TM distance: >3 FB  Neck ROM: full       Cardiovascular - normal exam  Rhythm: regular  Rate: abnormal  (-) murmur     Dental - normal exam        Facial Hair   Pulmonary - normal exam  Breath sounds clear to auscultation     Abdominal    Neurological - normal exam                 Anesthesia Plan    ASA 2       Plan - general       Airway plan will be ETT          Induction: intravenous    Postoperative Plan: Postoperative administration of opioids is intended.    Pertinent diagnostic labs and testing reviewed    Informed Consent:    Anesthetic plan and risks discussed with patient.    Use of blood products discussed with: patient whom consented to blood products.

## 2022-11-09 NOTE — ED PROVIDER NOTES
ED Provider Note    Scribed for Aayush Tamez M.D. by Mateo Kee. 11/9/2022,  2:22 AM.    Means of Arrival: walk in  History obtained from: patient  History limited by: none    CHIEF COMPLAINT  Chief Complaint   Patient presents with    Abdominal Pain       HPI  Jamie Figueroa is a 57 y.o. male who presents to the Emergency Department for abdominal pain onset 5 hours ago. He states that he has not had any bowel movements but does not excessive burping. His last bowel movement was yesterday. He localizes his pain to the upper abdomen, under his rib. He notes that upon exertion his pain is increased. He states that his pain has resolved a bit, from a 9 upon arrival to a 7 currently. He notes some associated nausea. He denies any vomiting, fevers, dysuria, or hematuria. He endorses some Pepto Bismol intake for his pain but it has only slightly alleviated his symptoms. He states no medical problems except for some minor shoulder pain. He denies any prior surgeries on his abdomen.    REVIEW OF SYSTEMS  CONSTITUTIONAL:  No fever.  GASTROINTESTINAL:  Abdominal pain and nausea. No vomiting.   GENITOURINARY:   No dysuria or hematuria.  See HPI for further details.   All other systems are negative.     PAST MEDICAL HISTORY  History reviewed. No pertinent past medical history.    FAMILY HISTORY  Family History   Problem Relation Age of Onset    Hyperlipidemia Mother     Clotting Disorder Mother         DVT leg    Heart Disease Father         MI at age 66, s/p CABG    Diabetes Father     Hyperlipidemia Father        SOCIAL HISTORY   reports that he has been smoking. He has never used smokeless tobacco. He reports current alcohol use. He reports that he does not use drugs.    SURGICAL HISTORY  Past Surgical History:   Procedure Laterality Date    VASECTOMY         CURRENT MEDICATIONS  Home Medications       Reviewed by Alison Reddy R.N. (Registered Nurse) on 11/08/22 at 9130  Med List Status: Complete  "    Medication Last Dose Status        Patient Andre Taking any Medications                           ALLERGIES  No Known Allergies    PHYSICAL EXAM   VITAL SIGNS: BP (!) 160/86   Pulse (!) 56   Temp 36.5 °C (97.7 °F) (Temporal)   Resp 17   Ht 1.651 m (5' 5\") Comment: Simultaneous filing. User may not have seen previous data.  Wt 75.3 kg (166 lb 0.1 oz)   SpO2 96%   BMI 27.62 kg/m²    Gen: Alert, no acute distress  HEENT: ATNC  Eyes: PERRL, EOMI, normal conjunctiva.   Neck: trachea midline  Resp: no respiratory distress  CV: No JVD  Abd: Diffuse tenderness greatest in the right upper quadrant, with no rebound or guarding. Mild right CVA tenderness. non-distended  Ext: No deformities  Psych: normal mood  Neuro: speech fluent     DIAGNOSTIC STUDIES / PROCEDURES       LABS  Labs Reviewed   CBC WITH DIFFERENTIAL - Abnormal; Notable for the following components:       Result Value    WBC 12.3 (*)     Neutrophils-Polys 86.90 (*)     Lymphocytes 8.10 (*)     Neutrophils (Absolute) 10.67 (*)     Lymphs (Absolute) 0.99 (*)     All other components within normal limits   COMP METABOLIC PANEL - Abnormal; Notable for the following components:    Glucose 119 (*)     All other components within normal limits   URINALYSIS - Abnormal; Notable for the following components:    Ketones 15 (*)     All other components within normal limits   LIPASE   ESTIMATED GFR   BLOOD CULTURE    Narrative:     Per Hospital Policy: Only change Specimen Src: to \"Line\" if  specified by physician order.   BLOOD CULTURE    Narrative:     Per Hospital Policy: Only change Specimen Src: to \"Line\" if  specified by physician order.     All labs reviewed by me.    RADIOLOGY  US-RUQ   Final Result      1.  Acute cholecystitis.   2.  Right hepatic lobe cyst measuring up to 4.5 cm.        The radiologist’s interpretation of all radiology studies have been reviewed by me.    COURSE & MEDICAL DECISION MAKING  Pertinent Labs & Imaging studies reviewed. (See " chart for details)    2:22 AM Patient seen and examined at bedside. Patient will be treated with Toradol 15 mg, Zofran 4 mg and Mylanta DS 20 mL for his symptoms.     3:13 AM - Patient will be treated with Zosyn 4.5 g in 100 mL NS.    3:27 AM - Paged General Surgery.    3:35 AM I discussed the patient's case and the above findings with Dr. Matthews (General Surgery) who will assess the patient for surgery.      Medical Decision Making:  Patient presents with upper abdominal pain, nausea.  He has greatest tenderness in the region of his gallbladder, no history of surgeries.  His labs are reassuring, however the ultrasound is concerning for acute cholecystitis.  This was discussed with general surgery, who will evaluate the patient for surgical intervention.  Patient given empiric antibiotics.  No evidence for ascending cholangitis, pancreatitis.    I reviewed the EKG, no evidence for ACS.  It reports anterior ST elevation but I disagree with the computer read.  I believe that this is artifact.  The EKG did not crossover into the computer system appropriately.    DISPOSITION:  Patient will be hospitalized by Dr. Matthews in guarded condition.    FINAL IMPRESSION  1. Acute cholecystitis      Mateo HOLLEY (Rae), am scribing for, and in the presence of, Aayush Tamez M.D..    Electronically signed by: Mateo Kee (Rae), 11/9/2022    Aayush HOLLEY M.D. personally performed the services described in this documentation, as scribed by Mateo Kee in my presence, and it is both accurate and complete.    The note accurately reflects work and decisions made by me.  Aayush Tamez M.D.  11/9/2022  3:53 AM    This dictation was created using voice recognition software. The accuracy of the dictation is limited to the abilities of the software. I expect there may be some errors of grammar and possibly content. The nursing notes were reviewed and certain aspects of this information were incorporated into this note.

## 2022-11-09 NOTE — ANESTHESIA PROCEDURE NOTES
Airway    Date/Time: 11/9/2022 9:16 AM  Performed by: Neftaly Feliz M.D.  Authorized by: Neftaly Fleiz M.D.     Location:  OR  Urgency:  Elective  Difficult Airway: No    Indications for Airway Management:  Anesthesia      Spontaneous Ventilation: absent    Sedation Level:  Deep  Preoxygenated: Yes    Patient Position:  Sniffing  Mask Difficulty Assessment:  2 - vent by mask + OA or adjuvant +/- NMBA  Final Airway Type:  Endotracheal airway  Final Endotracheal Airway:  ETT  Cuffed: Yes    Technique Used for Successful ETT Placement:  Direct laryngoscopy    Insertion Site:  Oral  Blade Type:  Briceno  Laryngoscope Blade/Videolaryngoscope Blade Size:  2  ETT Size (mm):  8.0  Measured from:  Teeth  ETT to Teeth (cm):  24  Placement Verified by: auscultation and capnometry    Cormack-Lehane Classification:  Grade IIa - partial view of glottis  Number of Attempts at Approach:  1

## 2022-11-09 NOTE — ANESTHESIA TIME REPORT
Anesthesia Start and Stop Event Times     Date Time Event    11/9/2022 0855 Ready for Procedure     0907 Anesthesia Start     1050 Anesthesia Stop        Responsible Staff  11/09/22    Name Role Begin End    Neftaly Feliz M.D. Anesth 0907 1050        Overtime Reason:  no overtime (within assigned shift)    Comments:

## 2022-11-09 NOTE — ANESTHESIA POSTPROCEDURE EVALUATION
Patient: Jamie Figueroa    Procedure Summary     Date: 11/09/22 Room / Location: [place] [address] / SURGERY [facility]    Anesthesia Start: 0907 Anesthesia Stop: 1050    Procedure: CHOLECYSTECTOMY, LAPAROSCOPIC (Abdomen) Diagnosis: (ACUTE CHOLECYSTITIS)    Surgeons: Maximilian Rojas D.O. Responsible Provider: Neftaly Feliz M.D.    Anesthesia Type: general ASA Status: 2          Final Anesthesia Type: general  Last vitals  BP   Blood Pressure: (!) 142/71    Temp   36.1 °C (96.9 °F)    Pulse   (!) 50   Resp   16    SpO2   93 %      Anesthesia Post Evaluation    Patient location during evaluation: PACU  Patient participation: complete - patient participated  Level of consciousness: awake and alert  Pain score: 4    Airway patency: patent  Anesthetic complications: no  Cardiovascular status: hemodynamically stable  Respiratory status: acceptable  Hydration status: euvolemic    PONV: none          No notable events documented.     Nurse Pain Score: 8 (NPRS)

## 2022-11-09 NOTE — OR NURSING
1048 Pt arrived from OR via [facility]. Report given by anesthesia and RN. Sleeping, perrla, opa, 10L mask even non labored breathing, lungs clear airway patent. SB. Skin pink warm dry. PIV patent. Abd soft non distended. X4 lap sites dermabond, well approximated, attached edges, no drainage, no hematoma. Cold pack.     1057 arousable. Gag reflex intact. Opa removed. Spontaneous even non labored breathing.     1107 awake alert, oriented, clear speech, follows commands. C/o  pain and nausea, treated per mar    1145 resting comfortably. Pain and nausea. Resolved. Abd soft non distended. X4 lap sites dermabond, well approximated, attached edges, no drainage, no hematoma. Cold pack.     1200 updated Sunita, spouse. O2 tank full for transfer. Persona belongings with pt    1230 no changes. Waiting for room assignment.     1300 report given to Alecia SUMNER T405. Awake alert, even non labored breathing. Abd no changes

## 2022-11-09 NOTE — ED TRIAGE NOTES
"Chief Complaint   Patient presents with    Abdominal Pain     Patient ambulatory to room with steady gait. Reports that he started to have URQ pain at 1500 today. States that \"he feels like he is about to explode\".   No Bowel movement.   He states that he received his a covid, flu, and shingles vaccination today.   "

## 2022-11-09 NOTE — H&P
"  CHIEF COMPLAINT: acute cholecystitis     HISTORY OF PRESENT ILLNESS: The patient is a 57 year-old  man who presents to the Emergency Department a 12- hour history of moderate to severe epigastric and right upper quadrant abdominal pain. The pain is associated with nausea. He denies any food intolerance or temporal relationship between symptoms and eating. The patient denies any recent or intercurrent illness. The patient denies any history of previous abdominal surgery.    PAST MEDICAL HISTORY:  has no past medical history on file.    PAST SURGICAL HISTORY:  has a past surgical history that includes vasectomy.    ALLERGIES: No Known Allergies    CURRENT MEDICATIONS:    Home Medications       Reviewed by Lacey Arreola (yuilop SL) on 11/09/22 at 0628  Med List Status: Complete     Medication Last Dose Status   acetaminophen (TYLENOL) 500 MG Tab 11/7/2022 Active                FAMILY HISTORY: family history includes Clotting Disorder in his mother; Diabetes in his father; Heart Disease in his father; Hyperlipidemia in his father and mother.    SOCIAL HISTORY:  reports that he has been smoking. He has never used smokeless tobacco. He reports current alcohol use. He reports that he does not use drugs.    REVIEW OF SYSTEMS: Comprehensive review of systems is negative with the exception of the aforementioned HPI, PMH, and PSH bullets in accordance with CMS guidelines.    PHYSICAL EXAMINATION:      Constitutional:     Vital Signs: BP (!) 144/71   Pulse (!) 46   Temp 36.5 °C (97.7 °F) (Temporal)   Resp 16   Ht 1.651 m (5' 5\")   Wt 75.3 kg (166 lb 0.1 oz)   SpO2 94%    General Appearance: Sleeping comfortably, appears stated age, no distress.  HEENT: The pupils are equal, round, and reactive to light bilaterally. The extraocular muscles are intact bilaterally. The sclera are non icteric. Nares and oropharynx are clear.   Neck: Supple. No adenopathy.  Respiratory:   Inspection: Unlabored respirations, " no intercostal retractions, paradoxical motion, or accessory muscle use.   Auscultation: clear to auscultation.  Cardiovascular:   Inspection: The skin is warm, dry, and well purfused.  Auscultation: Sinus bradycardia   Peripheral Pulses: Normal.   Abdomen:  Inspection: Abdominal inspection reveals  no abdominal distension.   Palpation: Palpation is remarkable for moderate tenderness in the right subcostal and right upper quadrant  region.   Extremities:   Examination of the upper and lower extremities demonstrates no cyanosis edema or clubbing.  Neurologic:   Alert & oriented to person, time and place. Normal motor function. Normal sensory function. No focal deficits noted.    LABORATORY VALUES:   Recent Labs     11/09/22 0015   WBC 12.3*   RBC 5.35   HEMOGLOBIN 16.7   HEMATOCRIT 47.8   MCV 89.3   MCH 31.2   MCHC 34.9   RDW 40.7   PLATELETCT 189   MPV 9.4     Recent Labs     11/09/22 0015   SODIUM 137   POTASSIUM 4.5   CHLORIDE 98   CO2 25   GLUCOSE 119*   BUN 13   CREATININE 0.62   CALCIUM 9.6     Recent Labs     11/09/22 0015   ASTSGOT 27   ALTSGPT 29   TBILIRUBIN 0.6   ALKPHOSPHAT 98   GLOBULIN 3.2     IMAGING:   US-RUQ   Final Result      1.  Acute cholecystitis.   2.  Right hepatic lobe cyst measuring up to 4.5 cm.          ASSESSMENT AND PLAN:     Acute cholecystitis.    The patient will be taken to the operating room for laparoscopic cholecystectomy. The surgical conduct was discussed in detail. Potential complications including, but not limited to, infection, bleeding, damage to adjacent structures, bile duct injury, need to convert to an open procedure, and anesthetic complications were discussed. Questions were elicited and answered to the patient's satisfaction.  Operative consent signed.      ____________________________________     Marcio Matthews M.D.    DD: 11/9/2022  3:32 AM

## 2022-11-09 NOTE — ED NOTES
Pt ambulates to [facility] with steady gait, pt complains of RUQ pain with some nausea, pt denies bloody stool or vomiting or R shoulder pain

## 2022-11-10 VITALS
HEIGHT: 65 IN | TEMPERATURE: 97.5 F | BODY MASS INDEX: 27.66 KG/M2 | RESPIRATION RATE: 17 BRPM | WEIGHT: 166.01 LBS | DIASTOLIC BLOOD PRESSURE: 56 MMHG | SYSTOLIC BLOOD PRESSURE: 114 MMHG | OXYGEN SATURATION: 90 % | HEART RATE: 65 BPM

## 2022-11-10 LAB
ALBUMIN SERPL BCP-MCNC: 3.7 G/DL (ref 3.2–4.9)
ALBUMIN/GLOB SERPL: 1.5 G/DL
ALP SERPL-CCNC: 71 U/L (ref 30–99)
ALT SERPL-CCNC: 122 U/L (ref 2–50)
ANION GAP SERPL CALC-SCNC: 9 MMOL/L (ref 7–16)
AST SERPL-CCNC: 92 U/L (ref 12–45)
BASOPHILS # BLD AUTO: 0.1 % (ref 0–1.8)
BASOPHILS # BLD: 0.01 K/UL (ref 0–0.12)
BILIRUB SERPL-MCNC: 0.3 MG/DL (ref 0.1–1.5)
BUN SERPL-MCNC: 13 MG/DL (ref 8–22)
CALCIUM SERPL-MCNC: 8.2 MG/DL (ref 8.5–10.5)
CHLORIDE SERPL-SCNC: 102 MMOL/L (ref 96–112)
CO2 SERPL-SCNC: 26 MMOL/L (ref 20–33)
CREAT SERPL-MCNC: 0.81 MG/DL (ref 0.5–1.4)
EOSINOPHIL # BLD AUTO: 0.01 K/UL (ref 0–0.51)
EOSINOPHIL NFR BLD: 0.1 % (ref 0–6.9)
ERYTHROCYTE [DISTWIDTH] IN BLOOD BY AUTOMATED COUNT: 41.1 FL (ref 35.9–50)
GFR SERPLBLD CREATININE-BSD FMLA CKD-EPI: 103 ML/MIN/1.73 M 2
GLOBULIN SER CALC-MCNC: 2.4 G/DL (ref 1.9–3.5)
GLUCOSE SERPL-MCNC: 151 MG/DL (ref 65–99)
HCT VFR BLD AUTO: 39.6 % (ref 42–52)
HGB BLD-MCNC: 13.6 G/DL (ref 14–18)
IMM GRANULOCYTES # BLD AUTO: 0.04 K/UL (ref 0–0.11)
IMM GRANULOCYTES NFR BLD AUTO: 0.4 % (ref 0–0.9)
LYMPHOCYTES # BLD AUTO: 0.99 K/UL (ref 1–4.8)
LYMPHOCYTES NFR BLD: 10.6 % (ref 22–41)
MAGNESIUM SERPL-MCNC: 2.4 MG/DL (ref 1.5–2.5)
MCH RBC QN AUTO: 31.1 PG (ref 27–33)
MCHC RBC AUTO-ENTMCNC: 34.3 G/DL (ref 33.7–35.3)
MCV RBC AUTO: 90.4 FL (ref 81.4–97.8)
MONOCYTES # BLD AUTO: 0.6 K/UL (ref 0–0.85)
MONOCYTES NFR BLD AUTO: 6.4 % (ref 0–13.4)
NEUTROPHILS # BLD AUTO: 7.66 K/UL (ref 1.82–7.42)
NEUTROPHILS NFR BLD: 82.4 % (ref 44–72)
NRBC # BLD AUTO: 0 K/UL
NRBC BLD-RTO: 0 /100 WBC
PHOSPHATE SERPL-MCNC: 2.4 MG/DL (ref 2.5–4.5)
PLATELET # BLD AUTO: 162 K/UL (ref 164–446)
PMV BLD AUTO: 9.6 FL (ref 9–12.9)
POTASSIUM SERPL-SCNC: 4 MMOL/L (ref 3.6–5.5)
PROT SERPL-MCNC: 6.1 G/DL (ref 6–8.2)
RBC # BLD AUTO: 4.38 M/UL (ref 4.7–6.1)
SODIUM SERPL-SCNC: 137 MMOL/L (ref 135–145)
WBC # BLD AUTO: 9.3 K/UL (ref 4.8–10.8)

## 2022-11-10 PROCEDURE — 700101 HCHG RX REV CODE 250: Performed by: SURGERY

## 2022-11-10 PROCEDURE — 85025 COMPLETE CBC W/AUTO DIFF WBC: CPT

## 2022-11-10 PROCEDURE — 83735 ASSAY OF MAGNESIUM: CPT

## 2022-11-10 PROCEDURE — 700102 HCHG RX REV CODE 250 W/ 637 OVERRIDE(OP): Performed by: SURGERY

## 2022-11-10 PROCEDURE — A9270 NON-COVERED ITEM OR SERVICE: HCPCS | Performed by: SURGERY

## 2022-11-10 PROCEDURE — 80053 COMPREHEN METABOLIC PANEL: CPT

## 2022-11-10 PROCEDURE — 84100 ASSAY OF PHOSPHORUS: CPT

## 2022-11-10 PROCEDURE — 700111 HCHG RX REV CODE 636 W/ 250 OVERRIDE (IP): Performed by: SURGERY

## 2022-11-10 PROCEDURE — 99024 POSTOP FOLLOW-UP VISIT: CPT | Performed by: NURSE PRACTITIONER

## 2022-11-10 RX ORDER — ACETAMINOPHEN 325 MG/1
650 TABLET ORAL EVERY 6 HOURS
Qty: 30 TABLET | Refills: 0 | COMMUNITY
Start: 2022-11-10

## 2022-11-10 RX ADMIN — METRONIDAZOLE 500 MG: 5 INJECTION, SOLUTION INTRAVENOUS at 06:41

## 2022-11-10 RX ADMIN — POLYETHYLENE GLYCOL 3350 1 PACKET: 17 POWDER, FOR SOLUTION ORAL at 06:36

## 2022-11-10 RX ADMIN — MAGNESIUM HYDROXIDE 30 ML: 400 SUSPENSION ORAL at 06:36

## 2022-11-10 RX ADMIN — DOCUSATE SODIUM 100 MG: 100 CAPSULE, LIQUID FILLED ORAL at 06:37

## 2022-11-10 RX ADMIN — CEFTRIAXONE SODIUM 1000 MG: 10 INJECTION, POWDER, FOR SOLUTION INTRAVENOUS at 06:36

## 2022-11-10 RX ADMIN — ACETAMINOPHEN 650 MG: 325 TABLET, FILM COATED ORAL at 06:37

## 2022-11-10 ASSESSMENT — PAIN DESCRIPTION - PAIN TYPE: TYPE: SURGICAL PAIN

## 2022-11-10 NOTE — OP REPORT
Operative Report    PreOp Diagnosis: Acute Cholecystitis       PostOp Diagnosis: Same      Procedure(s):  CHOLECYSTECTOMY, LAPAROSCOPIC - Wound Class: Clean Contaminated    Surgeon(s):  Maximilian Rojas D.O.    Anesthesiologist/Type of Anesthesia:  Anesthesiologist: Neftaly Feliz M.D./General    Surgical Staff:  Circulator: Jaleesa Garland R.N.; Rita Roberts R.N.  Relief Circulator: Anel Coughlin R.N.  Scrub Person: Theodore Santos  First Assist: SIL Aguirre    Specimens removed if any:  ID Type Source Tests Collected by Time Destination   A : Gallbladder and contents Tissue Gallbladder PATHOLOGY SPECIMEN Maximilian Rojas D.O. 11/9/2022 10:06 AM        Estimated Blood Loss: 25cc    Findings: Enlarged intrahepatic gallbladder with large stone. Gallbladder bed with significant inflammatory process and venous congestion.    Complications: none noted    Indications: 57 year old  male with history, physical examination and workup consitnt with acute cholecystitis.    OPERATIVE REPORT: The patient was brought to the operating room and placed in  supine position on the operating table. After adequate general anesthesia,   the abdomen was prepped and draped in standard fashion. An area inferior to the umbilucus was   infiltrated with 0.25% bupivacaine. An incision was made through the skin and  subcutaneous tissues. We then bluntly dissected down to the anterior fascia,  which was elevated into the wound using a Kocher clamp. A stay suture of 0   Vicryl was then placed. The fascia was then incised and we dissected through   the abdominal wall in layers until the peritoneum was entered. We then placed  the Julienne port and insufflated the abdomen. The area in the epigastrium was  chosen for a 5 mm port. The skin and subcutaneous tissue were infiltrated   with 0.25% bupivacaine. A small incision was made and a 5 mm port was   inserted under direct camera observation. Two additional 5 mm ports were    placed in the right lateral abdominal wall using identical technique. I then   grasped the fundus of the gallbladder and retracted it anteriorly and   superiorly. The gallbladder itself was more than 50% intrahepatic The infundibulum was retracted anteriorly and laterally. We then skeletonized the cystic duct, doubly clip distally and singly clipped   proximally and then divided with the endoscopic shear. The cystic artery was   then skeletonized and doubly clipped proximally and singly clipped distally   prior to dividing with the endoscopic shear. The gallbladder was then   dissected free from its fossa. Due to the intrahepatic nature of the gallbladder and the inflammatory process, this was slow work, requiring slow cauter and and, in one case, clipping a large distended vein. When this was completed, we placed in an EndoCatch bag and   retrieved it from the umbilical port site. We then irrigated the gallbladder   fossa in the right upper quadrant until the effluent was clear. There was no   sign of bleeding or bile leakage. The ports were then removed. The fascia at  the umbilical port site was closed using the stay suture placed at the   beginning of the case. The wounds were then irrigated and the skin was closed  using a 4-0 Monocryl stitch in a subcuticular fashion. The area was then   cleaned and dried and dermabond was applied. The patient was then awakened from anesthesia and taken to post-anesthesia care unit in stable condition. The sponge, needle, and   instrument count was correct at the end of the case and I was present for the   entirety of the case.    The nature of the surgical procedure warranted additional skilled operative assistance from an Advanced Registered Nurse Practitioner (ARNP). The assistant was present during the entire operation. The surgical assistant performed the following: provided assistance with optimal surgical exposure of the operative field, provided high complexity,  subspecialty decision making input, operated the camera for the laparoscopic portion of the procedure, assisted with the fascial closure, and performed the skin closure and dressing application.    11/10/2022 7:21 AM Maximilian Rojas D.O.

## 2022-11-10 NOTE — PROGRESS NOTES
D/c instructions given, educated on worsening s/s. Pt understands and questions answered. Iv removed, home with daughter

## 2022-11-10 NOTE — CARE PLAN
Problem: Knowledge Deficit - Standard  Goal: Patient and family/care givers will demonstrate understanding of plan of care, disease process/condition, diagnostic tests and medications  Outcome: Progressing     Problem: Communication  Goal: The ability to communicate needs accurately and effectively will improve  Outcome: Progressing     Problem: Respiratory  Goal: Patient will achieve/maintain optimum respiratory ventilation and gas exchange  Outcome: Progressing     The patient is Stable - Low risk of patient condition declining or worsening    Shift Goals  Clinical Goals: D/C  Patient Goals: rest    Progress made toward(s) clinical / shift goals:  Junaid ash POD #1; pt appears to be doing well. Pt is ambulating, no supp. O2 required, minimal c/o pain, no n+v, tolerating PO    Patient is not progressing towards the following goals:

## 2022-11-10 NOTE — CARE PLAN
The patient is Stable - Low risk of patient condition declining or worsening    Shift Goals  Clinical Goals: pain control, ambulate  Patient Goals: rest    Progress made toward(s) clinical / shift goals:      Report received from NOC RN.  Assessment complete.  A&O x 4. Patient calls appropriately.  Patient up with no assist. Ambulating around in room.   Patient has minimal pain at this time.  Patient denies SOB.  Patient is on room air.  Denies N&V. Tolerating diet.  Skin: x4 lap sites to abd CDI.  + void, PTA BM.  Review plan with of care with patient.   Call light and personal belongings with in reach.   Hourly rounding in place.   All needs met at this time.     Patient is not progressing towards the following goals:

## 2022-11-10 NOTE — PROGRESS NOTES
4 Eyes Skin Assessment Completed by TAISHA Anguiano and TAISHA eMra.    Head WDL  Ears WDL  Nose WDL  Mouth WDL  Neck WDL  Breast/Chest WDL  Shoulder Blades WDL  Spine WDL  (R) Arm/Elbow/Hand WDL  (L) Arm/Elbow/Hand WDL  Abdomen: 4x lap sites dermabond WILLI, CDI  Groin WDL  Scrotum/Coccyx/Buttocks WDL  (R) Leg WDL  (L) Leg WDL  (R) Heel/Foot/Toe WDL  (L) Heel/Foot/Toe WDL    Devices In Places: SCDs, pulse ox, 20g PIV RAC CDI      Interventions In Place N/A    Possible Skin Injury No    Pictures Uploaded Into Epic N/A  Wound Consult Placed N/A  RN Wound Prevention Protocol Ordered No

## 2022-11-10 NOTE — DISCHARGE INSTRUCTIONS
"Cholecystitis    Cholecystitis is irritation and swelling (inflammation) of the gallbladder. The gallbladder is an organ that is shaped like a pear. It is under the liver on the right side of the body. This organ stores bile. Bile helps the body break down (digest) the fats in food.  This condition can occur all of a sudden. It needs to be treated.  What are the causes?  This condition may be caused by stones or lumps that form in the gallbladder (gallstones). Gallstones can block the tube (duct) that carries bile out of your gallbladder.  Other causes are:  Damage to the gallbladder due to less blood flow.  Germs in the bile ducts.  Scars or kinks in the bile ducts.  Abnormal growths (tumors) in the liver, pancreas, or gallbladder.  What increases the risk?  You are more likely to develop this condition if:  You have sickle cell disease.  You take birth control pills.   You use estrogen.  You have alcoholic liver disease.  You have liver cirrhosis.  You are being fed through a vein.  You are very ill.  You do not eat or drink for a long time. This is also called \"fasting.\"  You are overweight (obese).  You lose weight too fast.  You are pregnant.  You have high levels of fat in the blood (triglycerides).  You have irritation and swelling of the pancreas (pancreatitis).  What are the signs or symptoms?  Symptoms of this condition include:  Pain in the belly (abdomen). Pain is often in the upper right area of the belly.  Tenderness or bloating in the belly.  Feeling sick to your stomach (nauseous).  Throwing up (vomiting).  Fever.  Chills.  How is this diagnosed?  This condition may be diagnosed with a medical history and exam. You may also have other tests, such as:  Imaging tests. This may include:  Ultrasound.  CT scan of the belly.  Nuclear scan. This is also called a HIDA scan. This scan lets your doctor see the bile as it moves in your body.  MRI.  Blood tests. These are done to check:  Your blood count. The " white blood cell count may be higher than normal.  How well your liver works.  How is this treated?  This condition may be treated with:  Surgery to take out your gallbladder.  Antibiotic medicines to treat illnesses caused by germs.  Going without food for some time.  Giving fluids through an IV tube.  Medicines to treat pain or throwing up.  Follow these instructions at home:  If you had surgery, follow instructions from your doctor about how to care for yourself after you go home.  Medicines    Take over-the-counter and prescription medicines only as told by your doctor.  If you were prescribed an antibiotic medicine, take it as told by your doctor. Do not stop taking it even if you start to feel better.  General instructions  Follow instructions from your doctor about what to eat or drink. Do not eat or drink anything that makes you sick again.  Do not lift anything that is heavier than 10 lb (4.5 kg) until your doctor says that it is safe.  Do not use any products that contain nicotine or tobacco, such as cigarettes and e-cigarettes. If you need help quitting, ask your doctor.  Keep all follow-up visits as told by your doctor. This is important.  Contact a doctor if:  You have pain and your medicine does not help.  You have a fever.  Get help right away if:  Your pain moves to:  Another part of your belly.  Your back.  Your symptoms do not go away.  You have new symptoms.  Summary  Cholecystitis is swelling and irritation of the gallbladder.  This condition may be caused by stones or lumps that form in the gallbladder (gallstones).  Common symptoms are pain in the belly. You may feel sick to your stomach and start throwing up. You may also have a fever and chills.  This condition may be treated with surgery to take out the gallbladder. It may also be treated with medicines, fasting, and fluids through an IV tube.  Follow what you are told about eating and drinking. Do not eat things that make you sick  again.  This information is not intended to replace advice given to you by your health care provider. Make sure you discuss any questions you have with your health care provider.  Document Released: 12/06/2012 Document Revised: 04/26/2019 Document Reviewed: 04/26/2019  Elsevier Patient Education © 2020 [employer]

## 2022-11-10 NOTE — DISCHARGE SUMMARY
Discharge Summary    DATE OF ADMISSION: 11/9/2022    DATE OF DISCHARGE: 11/10/2022    DISCHARGE DIAGNOSIS:  Cholecystitis    CONSULTATIONS:  none    PROCEDURES:  Procedure completed by Dr. Rojas on 10/9/2022: Laparoscopic cholecystectomy    BRIEF HPI and HOSPITAL COURSE:  Admitted with above, see admission history and physical. Underwent procedure as above. On day of discharge, the patient has stable vital signs, on room air, tolerating an oral diet, and pain is well controlled with PO meds. The patient is stable for discharge to home.    DISPOSITION: Discharged home on 11/10/2022. The patient was counseled and questions were answered. Specifically, signs and symptoms of infection, respiratory decompensation and persistent or worsening pain were discussed and the patient agrees to seek medical attention if any of these develop.    DISCHARGE MEDICATIONS:  The patients controlled substance history was reviewed and a controlled substance use informed consent (if applicable) was provided by [facility] and the patient has been prescribed.     Medication List        Change how you take these medications        Instructions   acetaminophen 325 MG Tabs  What changed:   medication strength  how much to take  when to take this  reasons to take this  additional instructions  Commonly known as: Tylenol   Take 2 Tablets by mouth every 6 hours. Take every 6 hours for the next 3 days then as needed  Dose: 650 mg              ACTIVITY:  No strenuous exercise or heavy lifting (more than 10 lbs) until released in follow up.    WOUND CARE:  You may shower, but do not submerge in a bath for at least two weeks. If you have wound dressings, they may come off after 48 hours. If you have skin glue to the wound, this will fall off on its own, do not pick at it. If you have steri strips to the wound, these will fall off on their own, do not pick at them, may trim the edges if needed.    DIET:  Orders Placed This  Encounter   Procedures    Diet Order Diet: Regular     Standing Status:   Standing     Number of Occurrences:   1     Order Specific Question:   Diet:     Answer:   Regular [1]       FOLLOW UP:  Maximilian Rojas D.O.  [address]  [place] [id number] [phone]    Schedule an appointment as soon as possible for a visit in 2 week(s)      Jas Knutson M.D.  [address]  [facility] [address]  [facility] [id number] [phone]    Schedule an appointment as soon as possible for a visit in 1 week(s)      TIME SPENT ON DISCHARGE: 33 minutes      ____________________________________________  SIL Aguirre    DD: 11/10/2022 10:02 AM

## 2022-11-14 LAB
BACTERIA BLD CULT: NORMAL
BACTERIA BLD CULT: NORMAL
PATHOLOGY CONSULT NOTE: NORMAL
SIGNIFICANT IND 70042: NORMAL
SIGNIFICANT IND 70042: NORMAL
SITE SITE: NORMAL
SITE SITE: NORMAL
SOURCE SOURCE: NORMAL
SOURCE SOURCE: NORMAL

## 2022-11-17 ENCOUNTER — OFFICE VISIT (OUTPATIENT)
Dept: URGENT CARE | Facility: PHYSICIAN GROUP | Age: 57
End: 2022-11-17
Payer: COMMERCIAL

## 2022-11-17 ENCOUNTER — HOSPITAL ENCOUNTER (OUTPATIENT)
Dept: RADIOLOGY | Facility: MEDICAL CENTER | Age: 57
End: 2022-11-17
Attending: NURSE PRACTITIONER
Payer: COMMERCIAL

## 2022-11-17 VITALS
TEMPERATURE: 98.4 F | OXYGEN SATURATION: 96 % | DIASTOLIC BLOOD PRESSURE: 78 MMHG | SYSTOLIC BLOOD PRESSURE: 112 MMHG | HEART RATE: 70 BPM | RESPIRATION RATE: 16 BRPM | BODY MASS INDEX: 27.49 KG/M2 | HEIGHT: 65 IN | WEIGHT: 165 LBS

## 2022-11-17 DIAGNOSIS — Z90.49 STATUS POST CHOLECYSTECTOMY: ICD-10-CM

## 2022-11-17 DIAGNOSIS — M25.512 ACUTE PAIN OF LEFT SHOULDER: ICD-10-CM

## 2022-11-17 PROCEDURE — 73030 X-RAY EXAM OF SHOULDER: CPT | Mod: LT

## 2022-11-17 PROCEDURE — 99204 OFFICE O/P NEW MOD 45 MIN: CPT | Performed by: NURSE PRACTITIONER

## 2022-11-17 RX ORDER — TIZANIDINE 2 MG/1
2 TABLET ORAL 3 TIMES DAILY PRN
Qty: 30 TABLET | Refills: 0 | Status: SHIPPED | OUTPATIENT
Start: 2022-11-17 | End: 2022-12-06 | Stop reason: SDUPTHER

## 2022-11-17 RX ORDER — PREDNISONE 20 MG/1
TABLET ORAL
Qty: 11 TABLET | Refills: 0 | Status: SHIPPED | OUTPATIENT
Start: 2022-11-17 | End: 2022-12-06 | Stop reason: SDUPTHER

## 2022-11-17 ASSESSMENT — ENCOUNTER SYMPTOMS
CONSTITUTIONAL NEGATIVE: 1
NEUROLOGICAL NEGATIVE: 1

## 2022-11-17 ASSESSMENT — FIBROSIS 4 INDEX: FIB4 SCORE: 2.88

## 2022-11-17 ASSESSMENT — VISUAL ACUITY: OU: 1

## 2022-11-18 NOTE — PATIENT INSTRUCTIONS
Details    Reading Physician Reading Date Result Priority   Ruperto John M.D.  [phone] 11/17/2022 Urgent Care     Narrative & Impression        HISTORY/REASON FOR EXAM:  Atraumatic Pain/Swelling/Deformity        TECHNIQUE/EXAM DESCRIPTION AND NUMBER OF VIEWS:  3 views of the left shoulder, 11/17/2022 6:45 PM.     COMPARISON: None     FINDINGS:  Views of the shoulder demonstrate no evidence of fracture or dislocation.  AC joint is intact.  The visualized lung parenchyma is clear.           IMPRESSION:     Unremarkable shoulder series.                 INTERPRETING LOCATION:  [address] [place] [address] [place], [id number]           Exam Ended: 11/17/22  6:52 PM Last Resulted: 11/17/22  6:53 PM

## 2022-11-18 NOTE — PROGRESS NOTES
"Subjective:     Jamie Figueroa is a 57 y.o. male who presents for Shoulder Pain (Started 1 month ago getting worse)       Shoulder Pain  This is a new problem. The problem has been gradually worsening.     Patient reporting spontaneous onset of left shoulder pain 1 month ago.  Denies injury.  Has limited range of motion due to the pain.  Tried Tylenol with temporary relief of pain.  Right-hand-dominant.    Review of Systems   Constitutional: Negative.    Musculoskeletal:         L shoulder pain   Neurological: Negative.    All other systems reviewed and are negative.    Refer to HPI for additional details.    During this visit, appropriate PPE was worn, hand hygiene was performed, and the patient and any visitors were masked.    PMH:  has no past medical history on file.    MEDS:   Current Outpatient Medications:     predniSONE (DELTASONE) 20 MG Tab, Take 3 tabs daily x 2 days, then 2 tabs daily x 2 days, then 1 tab on final day., Disp: 11 Tablet, Rfl: 0    tizanidine (ZANAFLEX) 2 MG tablet, Take 1 Tablet by mouth 3 times a day as needed (Muscle spasm)., Disp: 30 Tablet, Rfl: 0    acetaminophen (TYLENOL) 325 MG Tab, Take 2 Tablets by mouth every 6 hours. Take every 6 hours for the next 3 days then as needed, Disp: 30 Tablet, Rfl: 0    ALLERGIES: No Known Allergies  SURGHX:   Past Surgical History:   Procedure Laterality Date    ARNIE BY LAPAROSCOPY N/A 11/9/2022    Procedure: CHOLECYSTECTOMY, LAPAROSCOPIC;  Surgeon: Maximilian Rojas D.O.;  Location: SURGERY [facility];  Service: General    VASECTOMY       SOCHX:  reports that he has been smoking cigarettes. He has never used smokeless tobacco. He reports that he does not currently use alcohol. He reports that he does not use drugs.    FH: Per HPI as applicable/pertinent.      Objective:     /78 (BP Location: Left arm, Patient Position: Sitting, BP Cuff Size: Adult)   Pulse 70   Temp 36.9 °C (98.4 °F) (Temporal)   Resp 16   Ht 1.651 m (5' 5\")   Wt 74.8 " kg (165 lb)   SpO2 96%   BMI 27.46 kg/m²     Physical Exam  Nursing note reviewed.   Constitutional:       General: He is not in acute distress.     Appearance: He is well-developed. He is not ill-appearing or toxic-appearing.   Eyes:      General: Vision grossly intact.   Cardiovascular:      Rate and Rhythm: Normal rate.   Pulmonary:      Effort: Pulmonary effort is normal. No respiratory distress.   Musculoskeletal:         General: No deformity.      Left shoulder: Tenderness (Lateral, over deltoid) present. No swelling or deformity. Decreased range of motion (All planes, due to pain).   Skin:     General: Skin is warm and dry.      Coloration: Skin is not pale.   Neurological:      Mental Status: He is alert and oriented to person, place, and time.      Motor: No weakness.   Psychiatric:         Behavior: Behavior normal. Behavior is cooperative.     X-ray of left shoulder:    Details    Reading Physician Reading Date Result Priority   Ruperto John M.D.  [phone] 11/17/2022 Urgent Care     Narrative & Impression    HISTORY/REASON FOR EXAM:  Atraumatic Pain/Swelling/Deformity    TECHNIQUE/EXAM DESCRIPTION AND NUMBER OF VIEWS:  3 views of the left shoulder, 11/17/2022 6:45 PM.     COMPARISON: None     FINDINGS:  Views of the shoulder demonstrate no evidence of fracture or dislocation.  AC joint is intact.  The visualized lung parenchyma is clear.    IMPRESSION:     Unremarkable shoulder series     INTERPRETING LOCATION:  [address] [place] [address], [id number]           Exam Ended: 11/17/22  6:52 PM Last Resulted: 11/17/22  6:53 PM           Radiology report and images reviewed by myself. Concur with findings.      Assessment/Plan:     1. Acute pain of left shoulder  - DX-SHOULDER 2+ LEFT; Future  - predniSONE (DELTASONE) 20 MG Tab; Take 3 tabs daily x 2 days, then 2 tabs daily x 2 days, then 1 tab on final day.  Dispense: 11 Tablet; Refill: 0  - tizanidine (ZANAFLEX) 2 MG tablet; Take 1 Tablet by mouth 3 times a  day as needed (Muscle spasm).  Dispense: 30 Tablet; Refill: 0  - Referral to Sports Medicine  - Referral to establish with [facility] PCP    2. Status post cholecystectomy  - Referral to establish with [facility] PCP    Rx as above sent electronically.     Rest, ice, compress, elevate.  Avoid heavy pushing, pulling, or lifting with the left shoulder until symptoms improve or until as advised by sports medicine.    Differential diagnosis, natural history, supportive care, over-the-counter symptom management per 's instructions, close monitoring, and indications for immediate follow-up discussed.     All questions answered. Patient agrees with the plan of care.    Discharge summary provided.

## 2022-11-22 ENCOUNTER — TELEPHONE (OUTPATIENT)
Dept: HEALTH INFORMATION MANAGEMENT | Facility: OTHER | Age: 57
End: 2022-11-22
Payer: COMMERCIAL

## 2022-12-05 ENCOUNTER — OFFICE VISIT (OUTPATIENT)
Dept: MEDICAL GROUP | Facility: PHYSICIAN GROUP | Age: 57
End: 2022-12-05
Attending: NURSE PRACTITIONER
Payer: COMMERCIAL

## 2022-12-05 ENCOUNTER — TELEPHONE (OUTPATIENT)
Dept: MEDICAL GROUP | Facility: PHYSICIAN GROUP | Age: 57
End: 2022-12-05

## 2022-12-05 VITALS
BODY MASS INDEX: 27.35 KG/M2 | HEART RATE: 58 BPM | WEIGHT: 164.13 LBS | OXYGEN SATURATION: 95 % | HEIGHT: 65 IN | SYSTOLIC BLOOD PRESSURE: 116 MMHG | DIASTOLIC BLOOD PRESSURE: 68 MMHG | RESPIRATION RATE: 16 BRPM | TEMPERATURE: 98.7 F

## 2022-12-05 DIAGNOSIS — Z12.5 ENCOUNTER FOR SCREENING FOR MALIGNANT NEOPLASM OF PROSTATE: ICD-10-CM

## 2022-12-05 DIAGNOSIS — R53.83 OTHER FATIGUE: ICD-10-CM

## 2022-12-05 DIAGNOSIS — G89.29 CHRONIC LEFT SHOULDER PAIN: ICD-10-CM

## 2022-12-05 DIAGNOSIS — Z23 NEED FOR VACCINATION: ICD-10-CM

## 2022-12-05 DIAGNOSIS — F41.8 DEPRESSION WITH ANXIETY: ICD-10-CM

## 2022-12-05 DIAGNOSIS — E78.5 DYSLIPIDEMIA: ICD-10-CM

## 2022-12-05 DIAGNOSIS — M25.512 CHRONIC LEFT SHOULDER PAIN: ICD-10-CM

## 2022-12-05 PROCEDURE — 99214 OFFICE O/P EST MOD 30 MIN: CPT | Mod: 25 | Performed by: PHYSICIAN ASSISTANT

## 2022-12-05 PROCEDURE — 90746 HEPB VACCINE 3 DOSE ADULT IM: CPT | Performed by: PHYSICIAN ASSISTANT

## 2022-12-05 PROCEDURE — 90471 IMMUNIZATION ADMIN: CPT | Performed by: PHYSICIAN ASSISTANT

## 2022-12-05 PROCEDURE — 90472 IMMUNIZATION ADMIN EACH ADD: CPT | Performed by: PHYSICIAN ASSISTANT

## 2022-12-05 PROCEDURE — 90715 TDAP VACCINE 7 YRS/> IM: CPT | Performed by: PHYSICIAN ASSISTANT

## 2022-12-05 PROCEDURE — 90677 PCV20 VACCINE IM: CPT | Performed by: PHYSICIAN ASSISTANT

## 2022-12-05 RX ORDER — VENLAFAXINE HYDROCHLORIDE 37.5 MG/1
37.5 CAPSULE, EXTENDED RELEASE ORAL DAILY
Qty: 30 CAPSULE | Refills: 0 | Status: SHIPPED | OUTPATIENT
Start: 2022-12-05 | End: 2022-12-29

## 2022-12-05 ASSESSMENT — ENCOUNTER SYMPTOMS
SHORTNESS OF BREATH: 0
INSOMNIA: 1
HEADACHES: 0
BLURRED VISION: 0
WEIGHT LOSS: 0
COUGH: 0
DIARRHEA: 0
VOMITING: 0
FEVER: 0
NERVOUS/ANXIOUS: 1
NAUSEA: 0
DEPRESSION: 1
FALLS: 0
BRUISES/BLEEDS EASILY: 0
MYALGIAS: 0
DIAPHORESIS: 0
CHILLS: 0
WEAKNESS: 0
ABDOMINAL PAIN: 0
SORE THROAT: 0
ORTHOPNEA: 0
PALPITATIONS: 0
DIZZINESS: 0

## 2022-12-05 ASSESSMENT — ANXIETY QUESTIONNAIRES
5. BEING SO RESTLESS THAT IT IS HARD TO SIT STILL: SEVERAL DAYS
7. FEELING AFRAID AS IF SOMETHING AWFUL MIGHT HAPPEN: NOT AT ALL
6. BECOMING EASILY ANNOYED OR IRRITABLE: MORE THAN HALF THE DAYS
1. FEELING NERVOUS, ANXIOUS, OR ON EDGE: MORE THAN HALF THE DAYS
3. WORRYING TOO MUCH ABOUT DIFFERENT THINGS: NEARLY EVERY DAY
4. TROUBLE RELAXING: NEARLY EVERY DAY
2. NOT BEING ABLE TO STOP OR CONTROL WORRYING: NEARLY EVERY DAY
IF YOU CHECKED OFF ANY PROBLEMS ON THIS QUESTIONNAIRE, HOW DIFFICULT HAVE THESE PROBLEMS MADE IT FOR YOU TO DO YOUR WORK, TAKE CARE OF THINGS AT HOME, OR GET ALONG WITH OTHER PEOPLE: VERY DIFFICULT
GAD7 TOTAL SCORE: 14

## 2022-12-05 ASSESSMENT — PATIENT HEALTH QUESTIONNAIRE - PHQ9
5. POOR APPETITE OR OVEREATING: 2 - MORE THAN HALF THE DAYS
SUM OF ALL RESPONSES TO PHQ QUESTIONS 1-9: 18
CLINICAL INTERPRETATION OF PHQ2 SCORE: 4

## 2022-12-05 ASSESSMENT — FIBROSIS 4 INDEX: FIB4 SCORE: 2.88

## 2022-12-05 ASSESSMENT — LIFESTYLE VARIABLES: SUBSTANCE_ABUSE: 0

## 2022-12-05 NOTE — TELEPHONE ENCOUNTER
Phone Number Called: [phone] (home)       Call outcome: Left detailed message for patient. Informed to call back with any additional questions.    Message: Pt. Lvm inquiring about medications prescribed today that wasn't available at the pharmacy.  MA returned call and lvm to pt to find out which specific medication was not ready at pharmacy so we can call to inquire for him. Asked pt to return call at [phone].

## 2022-12-05 NOTE — PROGRESS NOTES
Subjective:     CC: Establish as a new patient.    HISTORY OF THE PRESENT ILLNESS: Patient is a 57 y.o. male. This pleasant patient is here today to establish care and discuss the following:    Problem   Chronic Left Shoulder Pain    Chronic, stable.  Started mid October 2022.  No injury.  Just started hurting out of nowhere.  Prednisone helped a lot.  APAP/ibu helps as well.  Unable to reach over head due to pain and it feels like it won't go.  Denies numbness/tingling, radiating pain.     Bmi 27.0-27.9,Adult    Chronic, stable.  Used to run on a regular basis but stopped about 5 years ago.  Has been trying to make some changes with activity and diet.     Depression With Anxiety    Chronic, uncontrolled.  Started at least a year ago.  Denies SI/HI    Depression Screening    Little interest or pleasure in doing things?  2 - more than half the days   Feeling down, depressed , or hopeless? 2 - more than half the days   Trouble falling or staying asleep, or sleeping too much?  3 - nearly every day   Feeling tired or having little energy?  2 - more than half the days   Poor appetite or overeating?  2 - more than half the days   Feeling bad about yourself - or that you are a failure or have let yourself or your family down? 2 - more than half the days   Trouble concentrating on things, such as reading the newspaper or watching television? 2 - more than half the days   Moving or speaking so slowly that other people could have noticed.  Or the opposite - being so fidgety or restless that you have been moving around a lot more than usual?  3 - nearly every day   Thoughts that you would be better off dead, or of hurting yourself?  0 - not at all   Patient Health Questionnaire Score: 18       If depressive symptoms identified deferred to follow up visit unless specifically addressed in assesment and plan.    Interpretation of PHQ-9 Total Score   Score Severity   1-4 No Depression   5-9 Mild Depression   10-14 Moderate  "Depression   15-19 Moderately Severe Depression   20-27 Severe Depression        KIKA-7 Questionnaire    Feeling nervous, anxious, or on edge: More than half the days  Not being able to sop or control worrying: Nearly every day  Worrying too much about different things: Nearly every day  Trouble relaxing: Nearly every day  Being so restless that it's hard to sit still: Several days  Becoming easily annoyed or irritable: More than half the days  Feeling afraid as if something awful might happen: Not at all  Total: 14    Interpretation of KIKA 7 Total Score   Score Severity :  0-4 No Anxiety   5-9 Mild Anxiety  10-14 Moderate Anxiety  15-21 Severe Anxiety             Health Maintenance: Completed    ROS:   Review of Systems   Constitutional:  Positive for malaise/fatigue. Negative for chills, diaphoresis, fever and weight loss.   HENT:  Negative for hearing loss and sore throat.    Eyes:  Negative for blurred vision.   Respiratory:  Negative for cough and shortness of breath.    Cardiovascular:  Negative for chest pain, palpitations, orthopnea and leg swelling.   Gastrointestinal:  Negative for abdominal pain, diarrhea, nausea and vomiting.   Genitourinary:  Negative for dysuria, frequency, hematuria and urgency.   Musculoskeletal:  Positive for joint pain (Left shoulder). Negative for falls and myalgias.   Skin:  Negative for rash.   Neurological:  Negative for dizziness, weakness and headaches.   Endo/Heme/Allergies:  Does not bruise/bleed easily.   Psychiatric/Behavioral:  Positive for depression. Negative for substance abuse and suicidal ideas. The patient is nervous/anxious and has insomnia.        Objective:       Exam: /68 (BP Location: Left arm, Patient Position: Sitting, BP Cuff Size: Adult)   Pulse (!) 58   Temp 37.1 °C (98.7 °F) (Temporal)   Resp 16   Ht 1.651 m (5' 5\")   Wt 74.4 kg (164 lb 2 oz)   SpO2 95%  Body mass index is 27.31 kg/m².    Physical Exam  Constitutional:       Appearance: Normal " appearance.   HENT:      Head: Normocephalic.   Eyes:      Conjunctiva/sclera: Conjunctivae normal.   Neck:      Vascular: No carotid bruit.   Cardiovascular:      Rate and Rhythm: Normal rate and regular rhythm.      Pulses: Normal pulses.      Heart sounds: No murmur heard.    No gallop.   Pulmonary:      Effort: No respiratory distress.      Breath sounds: No wheezing.   Musculoskeletal:         General: No swelling.      Comments: No localized tenderness noted of the left shoulder but patient is unable to reach over his head.  Strong left radial pulse.     Skin:     General: Skin is warm and dry.   Neurological:      General: No focal deficit present.      Mental Status: He is alert.   Psychiatric:         Mood and Affect: Mood normal.         Behavior: Behavior normal.     Assessment & Plan:   57 y.o. male with the following -    1. Depression with anxiety  Chronic, uncontrolled.  Denies SI/HI.  Discussed treatment options including lifestyle changes, therapy, medication.  Patient would like to try all 3.  Discussed increasing plant-based foods, decreasing animal-based foods.  Increase daily activity, aiming for 30-45 minutes brisk exercise daily.    - venlafaxine XR (EFFEXOR XR) 37.5 MG CAPSULE SR 24 HR; Take 1 Capsule by mouth every day.  Dispense: 30 Capsule; Refill: 0  - Referral to Behavioral Health    2. Dyslipidemia  Chronic, stable.  Information taken from historical labs.  Discussed increasing plant-based foods, decreasing animal-based foods.  Increase daily activity, aiming for 30-45 minutes brisk exercise daily.    3. Chronic left shoulder pain  Chronic, stable.  Referring to physical therapy and orthopedics.  MRI ordered.    - MR-SHOULDER-W/O LEFT; Future  - Referral to Orthopedics  - Referral to Physical Therapy    4. BMI 27.0-27.9,adult  Chronic, stable.  Discussed increasing plant-based foods, decreasing animal-based foods.  Increase daily activity, aiming for 30-45 minutes brisk exercise  daily.    - CBC WITH DIFFERENTIAL; Future  - Comp Metabolic Panel; Future  - TSH WITH REFLEX TO FT4; Future  - Lipid Profile; Future    5. Encounter for screening for malignant neoplasm of prostate    - PROSTATE SPECIFIC AG SCREENING; Future    6. Other fatigue    - CBC WITH DIFFERENTIAL; Future  - Comp Metabolic Panel; Future  - TSH WITH REFLEX TO FT4; Future    7. Need for vaccination    - Pneumococcal Conjugate Vaccine 20-Valent (19 yrs+)  - Tdap Vaccine =>6YO IM  - Hepatitis B Vaccine Adult IM      Return in about 1 month (around 1/5/2023) for anxiety, GAD7, depression, PHQ9, discuss labs.    Please note that this dictation was created using voice recognition software. I have made every reasonable attempt to correct obvious errors, but I expect that there are errors of grammar and possibly content that I did not discover before finalizing the note.

## 2022-12-05 NOTE — PATIENT INSTRUCTIONS
12/5/2022    Have your labs done. FASTING  You are starting a new medication: venlafaxine 37.5mg. This is for depression and anxiety.   Follow up with me in one month (before you run out of medication).  I referred you to behavioral health for therapy. They will send you a message on [employer] with the phone number.

## 2022-12-06 ENCOUNTER — PATIENT MESSAGE (OUTPATIENT)
Dept: MEDICAL GROUP | Facility: PHYSICIAN GROUP | Age: 57
End: 2022-12-06
Payer: COMMERCIAL

## 2022-12-06 DIAGNOSIS — M25.512 ACUTE PAIN OF LEFT SHOULDER: ICD-10-CM

## 2022-12-06 RX ORDER — PREDNISONE 20 MG/1
60 TABLET ORAL DAILY
Qty: 15 TABLET | Refills: 0 | Status: SHIPPED | OUTPATIENT
Start: 2022-12-06 | End: 2022-12-11

## 2022-12-06 RX ORDER — TIZANIDINE 2 MG/1
2 TABLET ORAL 3 TIMES DAILY PRN
Qty: 30 TABLET | Refills: 0 | Status: SHIPPED | OUTPATIENT
Start: 2022-12-06 | End: 2023-03-14

## 2022-12-06 NOTE — TELEPHONE ENCOUNTER
Received request via: Patient    Was the patient seen in the last year in this department? Yes    Does the patient have an active prescription (recently filled or refills available) for medication(s) requested? No    Does the patient have snf Plus and need 100 day supply (blood pressure, diabetes and cholesterol meds only)? Patient does not have SCP

## 2022-12-27 ENCOUNTER — HOSPITAL ENCOUNTER (OUTPATIENT)
Dept: LAB | Facility: MEDICAL CENTER | Age: 57
End: 2022-12-27
Attending: PHYSICIAN ASSISTANT
Payer: COMMERCIAL

## 2022-12-27 DIAGNOSIS — F41.8 DEPRESSION WITH ANXIETY: ICD-10-CM

## 2022-12-27 DIAGNOSIS — Z12.5 ENCOUNTER FOR SCREENING FOR MALIGNANT NEOPLASM OF PROSTATE: ICD-10-CM

## 2022-12-27 DIAGNOSIS — R53.83 OTHER FATIGUE: ICD-10-CM

## 2022-12-27 LAB
ALBUMIN SERPL BCP-MCNC: 4.3 G/DL (ref 3.2–4.9)
ALBUMIN/GLOB SERPL: 1.5 G/DL
ALP SERPL-CCNC: 97 U/L (ref 30–99)
ALT SERPL-CCNC: 24 U/L (ref 2–50)
ANION GAP SERPL CALC-SCNC: 8 MMOL/L (ref 7–16)
AST SERPL-CCNC: 23 U/L (ref 12–45)
BASOPHILS # BLD AUTO: 0.6 % (ref 0–1.8)
BASOPHILS # BLD: 0.03 K/UL (ref 0–0.12)
BILIRUB SERPL-MCNC: 0.6 MG/DL (ref 0.1–1.5)
BUN SERPL-MCNC: 8 MG/DL (ref 8–22)
CALCIUM ALBUM COR SERPL-MCNC: 9.1 MG/DL (ref 8.5–10.5)
CALCIUM SERPL-MCNC: 9.3 MG/DL (ref 8.4–10.2)
CHLORIDE SERPL-SCNC: 106 MMOL/L (ref 96–112)
CHOLEST SERPL-MCNC: 265 MG/DL (ref 100–199)
CO2 SERPL-SCNC: 28 MMOL/L (ref 20–33)
CREAT SERPL-MCNC: 0.78 MG/DL (ref 0.5–1.4)
EOSINOPHIL # BLD AUTO: 0.15 K/UL (ref 0–0.51)
EOSINOPHIL NFR BLD: 3 % (ref 0–6.9)
ERYTHROCYTE [DISTWIDTH] IN BLOOD BY AUTOMATED COUNT: 41.8 FL (ref 35.9–50)
FASTING STATUS PATIENT QL REPORTED: NORMAL
GFR SERPLBLD CREATININE-BSD FMLA CKD-EPI: 104 ML/MIN/1.73 M 2
GLOBULIN SER CALC-MCNC: 2.9 G/DL (ref 1.9–3.5)
GLUCOSE SERPL-MCNC: 93 MG/DL (ref 65–99)
HCT VFR BLD AUTO: 46.7 % (ref 42–52)
HDLC SERPL-MCNC: 53 MG/DL
HGB BLD-MCNC: 16 G/DL (ref 14–18)
IMM GRANULOCYTES # BLD AUTO: 0.01 K/UL (ref 0–0.11)
IMM GRANULOCYTES NFR BLD AUTO: 0.2 % (ref 0–0.9)
LDLC SERPL CALC-MCNC: 181 MG/DL
LYMPHOCYTES # BLD AUTO: 1.86 K/UL (ref 1–4.8)
LYMPHOCYTES NFR BLD: 36.6 % (ref 22–41)
MCH RBC QN AUTO: 30.9 PG (ref 27–33)
MCHC RBC AUTO-ENTMCNC: 34.3 G/DL (ref 33.7–35.3)
MCV RBC AUTO: 90.2 FL (ref 81.4–97.8)
MONOCYTES # BLD AUTO: 0.54 K/UL (ref 0–0.85)
MONOCYTES NFR BLD AUTO: 10.6 % (ref 0–13.4)
NEUTROPHILS # BLD AUTO: 2.49 K/UL (ref 1.82–7.42)
NEUTROPHILS NFR BLD: 49 % (ref 44–72)
NRBC # BLD AUTO: 0 K/UL
NRBC BLD-RTO: 0 /100 WBC
PLATELET # BLD AUTO: 160 K/UL (ref 164–446)
PMV BLD AUTO: 9.8 FL (ref 9–12.9)
POTASSIUM SERPL-SCNC: 4.1 MMOL/L (ref 3.6–5.5)
PROT SERPL-MCNC: 7.2 G/DL (ref 6–8.2)
PSA SERPL-MCNC: 0.87 NG/ML (ref 0–4)
RBC # BLD AUTO: 5.18 M/UL (ref 4.7–6.1)
SODIUM SERPL-SCNC: 142 MMOL/L (ref 135–145)
TRIGL SERPL-MCNC: 156 MG/DL (ref 0–149)
TSH SERPL DL<=0.005 MIU/L-ACNC: 2.7 UIU/ML (ref 0.38–5.33)
WBC # BLD AUTO: 5.1 K/UL (ref 4.8–10.8)

## 2022-12-27 PROCEDURE — 36415 COLL VENOUS BLD VENIPUNCTURE: CPT

## 2022-12-27 PROCEDURE — 84153 ASSAY OF PSA TOTAL: CPT

## 2022-12-27 PROCEDURE — 80061 LIPID PANEL: CPT

## 2022-12-27 PROCEDURE — 84443 ASSAY THYROID STIM HORMONE: CPT

## 2022-12-27 PROCEDURE — 80053 COMPREHEN METABOLIC PANEL: CPT

## 2022-12-27 PROCEDURE — 85025 COMPLETE CBC W/AUTO DIFF WBC: CPT

## 2022-12-29 PROBLEM — E78.5 DYSLIPIDEMIA: Status: ACTIVE | Noted: 2022-12-29

## 2022-12-29 RX ORDER — VENLAFAXINE HYDROCHLORIDE 37.5 MG/1
37.5 CAPSULE, EXTENDED RELEASE ORAL DAILY
Qty: 90 CAPSULE | Refills: 0 | Status: SHIPPED | OUTPATIENT
Start: 2022-12-29 | End: 2023-04-11

## 2022-12-29 NOTE — TELEPHONE ENCOUNTER
Received request via: Pharmacy    Was the patient seen in the last year in this department? Yes    Does the patient have an active prescription (recently filled or refills available) for medication(s) requested? No    Does the patient have [facility] Plus and need 100 day supply (blood pressure, diabetes and cholesterol meds only)? Patient does not have SCP

## 2023-03-14 ENCOUNTER — OFFICE VISIT (OUTPATIENT)
Dept: MEDICAL GROUP | Facility: PHYSICIAN GROUP | Age: 58
End: 2023-03-14
Payer: COMMERCIAL

## 2023-03-14 VITALS
SYSTOLIC BLOOD PRESSURE: 128 MMHG | DIASTOLIC BLOOD PRESSURE: 72 MMHG | HEART RATE: 63 BPM | OXYGEN SATURATION: 96 % | TEMPERATURE: 98 F | HEIGHT: 65 IN | BODY MASS INDEX: 27.82 KG/M2 | RESPIRATION RATE: 18 BRPM | WEIGHT: 167 LBS

## 2023-03-14 DIAGNOSIS — G89.29 CHRONIC LEFT SHOULDER PAIN: ICD-10-CM

## 2023-03-14 DIAGNOSIS — L98.9 SKIN LESION: ICD-10-CM

## 2023-03-14 DIAGNOSIS — M25.512 CHRONIC LEFT SHOULDER PAIN: ICD-10-CM

## 2023-03-14 DIAGNOSIS — E78.5 DYSLIPIDEMIA: ICD-10-CM

## 2023-03-14 DIAGNOSIS — D69.6 THROMBOCYTOPENIA (HCC): ICD-10-CM

## 2023-03-14 DIAGNOSIS — Z12.11 COLON CANCER SCREENING: ICD-10-CM

## 2023-03-14 DIAGNOSIS — F41.8 DEPRESSION WITH ANXIETY: ICD-10-CM

## 2023-03-14 PROCEDURE — 99214 OFFICE O/P EST MOD 30 MIN: CPT | Performed by: PHYSICIAN ASSISTANT

## 2023-03-14 RX ORDER — ATORVASTATIN CALCIUM 10 MG/1
10 TABLET, FILM COATED ORAL NIGHTLY
Qty: 90 TABLET | Refills: 1 | Status: SHIPPED | OUTPATIENT
Start: 2023-03-14

## 2023-03-14 ASSESSMENT — ENCOUNTER SYMPTOMS
SHORTNESS OF BREATH: 0
FEVER: 0
CHILLS: 0

## 2023-03-14 ASSESSMENT — PATIENT HEALTH QUESTIONNAIRE - PHQ9: CLINICAL INTERPRETATION OF PHQ2 SCORE: 0

## 2023-03-14 ASSESSMENT — FIBROSIS 4 INDEX: FIB4 SCORE: 1.64

## 2023-03-14 NOTE — PROGRESS NOTES
Subjective:     CC: Lab follow-up    HPI:   Jamie presents today with    Problem   Thrombocytopenia (Hcc)    Chronic, uncontrolled.  160,000, December 2022.  Decreasing over the last few months.  Repeating labs.     Skin Lesion    Chronic, uncontrolled.  Has multiple skin lesions on his face that he would like to have looked at.     Dyslipidemia    Chronic, uncontrolled.   Latest Labs:   Lab Results   Component Value Date/Time    CHOLSTRLTOT 265 (H) 12/27/2022 10:06 AM     (H) 12/27/2022 10:06 AM    HDL 53 12/27/2022 10:06 AM    TRIGLYCERIDE 156 (H) 12/27/2022 10:06 AM      Medications: Starting atorvastatin 10 mg 3/14/2023  Medication side effects:   Diet/Exercise: We discussed increasing plant-based foods and decreasing animal-based and processed foods  Family History of high cholesterol or heart disease?   Risk calculator: The 10-year ASCVD risk score (Chelsie SAMUELS, et al., 2019) is: 14.1%          Chronic Left Shoulder Pain    Chronic, stable.  Started mid October 2022.  No injury.  Just started hurting out of nowhere.  Prednisone helped a lot.  APAP/ibu helps as well.  Unable to reach over head due to pain and it feels like it won't go.  Denies numbness/tingling, radiating pain.    3/14/2023:  Significant improvement in his left shoulder pain with good range of motion, minimal pain.  Did not do physical therapy.  Has not yet had the MRI done.     Depression With Anxiety    Chronic, uncontrolled.  Started at least a year ago.  Denies SI/HI  PHQ-9: 18  KIKA-7: 14    3/14/2023:  Patient stopped taking venlafaxine 37.5 because he did not feel it was doing anything.  Overall he is feeling a little bit better but did not understand he could increase the dose.  He thinks he will go back on the medication, increasing the dose after 1 week.         Health Maintenance: Completed    ROS:  Review of Systems   Constitutional:  Negative for chills and fever.   Respiratory:  Negative for shortness of breath.   "  Cardiovascular:  Negative for chest pain.     Objective:     Exam:  /72   Pulse 63   Temp 36.7 °C (98 °F) (Temporal)   Resp 18   Ht 1.651 m (5' 5\")   Wt 75.8 kg (167 lb)   SpO2 96%   BMI 27.79 kg/m²  Body mass index is 27.79 kg/m².    Physical Exam  Constitutional:       Appearance: Normal appearance.   HENT:      Head: Normocephalic.   Eyes:      Conjunctiva/sclera: Conjunctivae normal.   Pulmonary:      Effort: Pulmonary effort is normal.   Musculoskeletal:         General: No swelling.   Skin:     General: Skin is warm and dry.   Neurological:      General: No focal deficit present.      Mental Status: He is alert.   Psychiatric:         Mood and Affect: Mood normal.         Behavior: Behavior normal.       Assessment & Plan:     57 y.o. male with the following -     1. Dyslipidemia  Chronic, uncontrolled.  Starting atorvastatin 10 mg daily.  Repeating labs in 3-4 months.    - atorvastatin (LIPITOR) 10 MG Tab; Take 1 Tablet by mouth every evening. CHOLESTEROL  Dispense: 90 Tablet; Refill: 1  - Lipid Profile; Future    2. Depression with anxiety  Chronic, improving.  Patient is going to restart venlafaxine 37.5 mg and double the dose if needed.  Follow-up in 3-4 months, sooner as needed.    3. Thrombocytopenia (HCC)  Chronic, uncontrolled.  Repeating labs in 3-4 months.    - CBC WITH DIFFERENTIAL; Future    4. Skin lesion  Chronic, uncontrolled.  Multiple skin lesions on his face and body that he would like to have looked at.  Referring to dermatology for further evaluation.    - Referral to Dermatology    5. Chronic left shoulder pain  Chronic, improving.  States he has almost no pain right now with significant improvement in range of motion.  Did not do physical therapy or have the MRI done.    6. Colon cancer screening    - COLOGUARD (FIT DNA)      Return in about 4 months (around 7/14/2023).    Please note that this dictation was created using voice recognition software. I have made every " reasonable attempt to correct obvious errors, but I expect that there are errors of grammar and possibly content that I did not discover before finalizing the note.

## 2023-04-10 DIAGNOSIS — F41.8 DEPRESSION WITH ANXIETY: ICD-10-CM

## 2023-04-11 RX ORDER — VENLAFAXINE HYDROCHLORIDE 37.5 MG/1
37.5 CAPSULE, EXTENDED RELEASE ORAL DAILY
Qty: 90 CAPSULE | Refills: 0 | Status: SHIPPED | OUTPATIENT
Start: 2023-04-11

## 2023-04-18 ENCOUNTER — TELEPHONE (OUTPATIENT)
Dept: HEALTH INFORMATION MANAGEMENT | Facility: OTHER | Age: 58
End: 2023-04-18
Payer: COMMERCIAL

## 2025-04-22 ENCOUNTER — OFFICE VISIT (OUTPATIENT)
Dept: MEDICAL GROUP | Facility: PHYSICIAN GROUP | Age: 60
End: 2025-04-22
Payer: COMMERCIAL

## 2025-04-22 VITALS
RESPIRATION RATE: 10 BRPM | BODY MASS INDEX: 27.74 KG/M2 | DIASTOLIC BLOOD PRESSURE: 78 MMHG | HEIGHT: 65 IN | WEIGHT: 166.5 LBS | OXYGEN SATURATION: 96 % | SYSTOLIC BLOOD PRESSURE: 116 MMHG | HEART RATE: 67 BPM | TEMPERATURE: 97.6 F

## 2025-04-22 DIAGNOSIS — E78.5 DYSLIPIDEMIA: ICD-10-CM

## 2025-04-22 DIAGNOSIS — Z12.5 ENCOUNTER FOR SCREENING FOR MALIGNANT NEOPLASM OF PROSTATE: ICD-10-CM

## 2025-04-22 DIAGNOSIS — R53.83 FATIGUE, UNSPECIFIED TYPE: ICD-10-CM

## 2025-04-22 DIAGNOSIS — Z12.11 COLON CANCER SCREENING: ICD-10-CM

## 2025-04-22 DIAGNOSIS — M25.512 CHRONIC LEFT SHOULDER PAIN: ICD-10-CM

## 2025-04-22 DIAGNOSIS — Z11.3 SCREENING EXAMINATION FOR SEXUALLY TRANSMITTED DISEASE: ICD-10-CM

## 2025-04-22 DIAGNOSIS — F41.8 DEPRESSION WITH ANXIETY: ICD-10-CM

## 2025-04-22 DIAGNOSIS — H93.13 TINNITUS OF BOTH EARS: ICD-10-CM

## 2025-04-22 DIAGNOSIS — L98.9 SKIN LESION: ICD-10-CM

## 2025-04-22 DIAGNOSIS — E55.9 VITAMIN D DEFICIENCY: ICD-10-CM

## 2025-04-22 DIAGNOSIS — Z11.59 NEED FOR HEPATITIS C SCREENING TEST: ICD-10-CM

## 2025-04-22 DIAGNOSIS — D69.6 THROMBOCYTOPENIA (HCC): ICD-10-CM

## 2025-04-22 DIAGNOSIS — G89.29 CHRONIC LEFT SHOULDER PAIN: ICD-10-CM

## 2025-04-22 DIAGNOSIS — G47.9 SLEEP DISTURBANCE: ICD-10-CM

## 2025-04-22 PROBLEM — K81.9 CHOLECYSTITIS: Status: RESOLVED | Noted: 2022-11-09 | Resolved: 2025-04-22

## 2025-04-22 PROCEDURE — 3074F SYST BP LT 130 MM HG: CPT | Performed by: PHYSICIAN ASSISTANT

## 2025-04-22 PROCEDURE — 3078F DIAST BP <80 MM HG: CPT | Performed by: PHYSICIAN ASSISTANT

## 2025-04-22 PROCEDURE — 99214 OFFICE O/P EST MOD 30 MIN: CPT | Performed by: PHYSICIAN ASSISTANT

## 2025-04-22 RX ORDER — BUSPIRONE HYDROCHLORIDE 10 MG/1
10 TABLET ORAL 2 TIMES DAILY
Qty: 60 TABLET | Refills: 1 | Status: SHIPPED | OUTPATIENT
Start: 2025-04-22

## 2025-04-22 ASSESSMENT — ENCOUNTER SYMPTOMS
FEVER: 0
CHILLS: 0
SHORTNESS OF BREATH: 0

## 2025-04-22 ASSESSMENT — ANXIETY QUESTIONNAIRES
7. FEELING AFRAID AS IF SOMETHING AWFUL MIGHT HAPPEN: SEVERAL DAYS
1. FEELING NERVOUS, ANXIOUS, OR ON EDGE: NEARLY EVERY DAY
6. BECOMING EASILY ANNOYED OR IRRITABLE: NEARLY EVERY DAY
2. NOT BEING ABLE TO STOP OR CONTROL WORRYING: NEARLY EVERY DAY
4. TROUBLE RELAXING: NEARLY EVERY DAY
GAD7 TOTAL SCORE: 16
5. BEING SO RESTLESS THAT IT IS HARD TO SIT STILL: NOT AT ALL
3. WORRYING TOO MUCH ABOUT DIFFERENT THINGS: NEARLY EVERY DAY

## 2025-04-22 ASSESSMENT — PATIENT HEALTH QUESTIONNAIRE - PHQ9
SUM OF ALL RESPONSES TO PHQ QUESTIONS 1-9: 14
5. POOR APPETITE OR OVEREATING: 1 - SEVERAL DAYS
CLINICAL INTERPRETATION OF PHQ2 SCORE: 4

## 2025-04-22 NOTE — ASSESSMENT & PLAN NOTE
Chronic, uncontrolled.   No medication (stopped taking atorvastatin, ran out).  Repeating labs.    Latest Labs:   Lab Results   Component Value Date/Time    CHOLSTRLTOT 265 (H) 12/27/2022 10:06 AM     (H) 12/27/2022 10:06 AM    HDL 53 12/27/2022 10:06 AM    TRIGLYCERIDE 156 (H) 12/27/2022 10:06 AM      Medications: none (stopped taking atorvastatin, ran out)    Diet/Exercise: We discussed increasing plant-based foods and decreasing animal-based and processed foods  Family History of high cholesterol or heart disease?   Risk calculator: The ASCVD Risk score (Chelsie DK, et al., 2019) failed to calculate.

## 2025-04-22 NOTE — ASSESSMENT & PLAN NOTE
Interval history 3/2023:  Chronic, stable.  Started mid October 2022.  No injury.  Just started hurting out of nowhere.  Prednisone helped a lot.  APAP/ibu helps as well.  Unable to reach over head due to pain and it feels like it won't go.  Denies numbness/tingling, radiating pain.    3/14/2023:  Significant improvement in his left shoulder pain with good range of motion, minimal pain.  Did not do physical therapy.  Has not yet had the MRI done.

## 2025-04-22 NOTE — PROGRESS NOTES
"SUBJECTIVE:     CC: ; Last evaluation 3/14/2023    HPI:   Jamie presents today with the following:    ASSESSMENT & PLAN by Problem:     Problem   Sleep Disturbance    Chronic, uncontrolled.  Starting amitriptyline 25mg.     Thrombocytopenia (Hcc)    Chronic, uncontrolled.  160,000, December 2022.  Decreasing over the last few months.  Repeating labs.      Dyslipidemia    Chronic, uncontrolled.   No medication (stopped taking atorvastatin, ran out).  Repeating labs.       Depression With Anxiety    Chronic, uncontrolled.  Discussed daily medication v prn medication.  Starting buspirone prn.  Referring for therapy.  Will follow up in 2-4 weeks to reevaluate need for daily medication.     Chronic Left Shoulder Pain (Resolved)   Bmi 27.0-27.9,Adult (Resolved)    Chronic, stable.  Used to run on a regular basis but stopped about 5 years ago.  Has been trying to make some changes with activity and diet.     Cholecystitis (Resolved)       Return in about 4 weeks (around 5/20/2025) for lab discussion, depression, PHQ9, anxiety, GAD7.         HPI:     Problem List Items Addressed This Visit       RESOLVED: Chronic left shoulder pain      Interval history 3/2023:  Chronic, stable.  Started mid October 2022.  No injury.  Just started hurting out of nowhere.  Prednisone helped a lot.  APAP/ibu helps as well.  Unable to reach over head due to pain and it feels like it won't go.  Denies numbness/tingling, radiating pain.    3/14/2023:  Significant improvement in his left shoulder pain with good range of motion, minimal pain.  Did not do physical therapy.  Has not yet had the MRI done.         Relevant Medications    amitriptyline (ELAVIL) 25 MG Tab    Depression with anxiety    Chronic, uncontrolled.  Years.  PHQ9: 14  GAD7: 16  \"Often I feel tired and depressed, I don't want to do anything.\"  Not sleeping well.  Hard time falling and staying asleep.  Went back to [place] for a while to see if it helped but it didn't (was there " for 6 months).  Therapy: history of, years ago.  Willing to try again.  Denies SI/HI.  Doesn't enjoy the things he typically enjoys.  Lacks motivation to do things.  Feels tired.  Worries about a lot of things.    Tried:  Venlafaxine: low libido    Interval history 3/2023:  Chronic, uncontrolled.  Started at least a year ago.  Denies SI/HI  PHQ-9: 18  KIKA-7: 14    3/14/2023:  Patient stopped taking venlafaxine 37.5 because he did not feel it was doing anything.  Overall he is feeling a little bit better but did not understand he could increase the dose.  He thinks he will go back on the medication, increasing the dose after 1 week.         Relevant Medications    busPIRone (BUSPAR) 10 MG Tab tablet    amitriptyline (ELAVIL) 25 MG Tab    Other Relevant Orders    Referral to Behavioral Health    Dyslipidemia    Chronic, uncontrolled.   No medication (stopped taking atorvastatin, ran out).  Repeating labs.    Latest Labs:   Lab Results   Component Value Date/Time    CHOLSTRLTOT 265 (H) 12/27/2022 10:06 AM     (H) 12/27/2022 10:06 AM    HDL 53 12/27/2022 10:06 AM    TRIGLYCERIDE 156 (H) 12/27/2022 10:06 AM      Medications: none (stopped taking atorvastatin, ran out)    Diet/Exercise: We discussed increasing plant-based foods and decreasing animal-based and processed foods  Family History of high cholesterol or heart disease?   Risk calculator: The ASCVD Risk score (Chelsie DK, et al., 2019) failed to calculate.          Relevant Orders    Lipid Profile    Skin lesion    Relevant Orders    Referral to Dermatology    Sleep disturbance    Relevant Medications    amitriptyline (ELAVIL) 25 MG Tab    Thrombocytopenia (HCC)     Other Visit Diagnoses         Screening examination for sexually transmitted disease        Relevant Orders    HIV AG/AB COMBO ASSAY SCREENING      Need for hepatitis C screening test        Relevant Orders    HEP C VIRUS ANTIBODY      Fatigue, unspecified type        Relevant Orders    CBC WITH  "DIFFERENTIAL    Comp Metabolic Panel    TSH WITH REFLEX TO FT4      Encounter for screening for malignant neoplasm of prostate        Relevant Orders    PROSTATE SPECIFIC AG SCREENING      Vitamin D deficiency        Relevant Orders    VITAMIN D,25 HYDROXY (DEFICIENCY)      Colon cancer screening        Relevant Orders    Referral to GI for Colonoscopy      Tinnitus of both ears        Relevant Orders    Referral to Audiology                   ROS:  Review of Systems   Constitutional:  Negative for chills and fever.   Respiratory:  Negative for shortness of breath.    Cardiovascular:  Negative for chest pain.       OBJECTIVE:     Exam:  /78   Pulse 67   Temp 36.4 °C (97.6 °F) (Temporal)   Resp (!) 10   Ht 1.651 m (5' 5\")   Wt 75.5 kg (166 lb 8 oz)   SpO2 96%   BMI 27.71 kg/m²  Body mass index is 27.71 kg/m².    Physical Exam  Vitals reviewed.   Constitutional:       General: He is not in acute distress.     Appearance: Normal appearance.   HENT:      Head: Normocephalic.      Right Ear: Tympanic membrane normal.      Left Ear: Tympanic membrane normal.   Eyes:      Conjunctiva/sclera: Conjunctivae normal.   Cardiovascular:      Rate and Rhythm: Normal rate and regular rhythm.      Pulses: Normal pulses.      Heart sounds: No murmur heard.     No gallop.   Pulmonary:      Effort: Pulmonary effort is normal. No respiratory distress.      Breath sounds: No wheezing.   Musculoskeletal:         General: No swelling.   Skin:     General: Skin is warm and dry.   Neurological:      General: No focal deficit present.      Mental Status: He is alert.   Psychiatric:         Mood and Affect: Mood normal.         Behavior: Behavior normal.         Judgment: Judgment normal.                Please note that this dictation was created using voice recognition software. I have made every reasonable attempt to correct obvious errors, but I expect that there are errors of grammar and possibly content that I did not " discover before finalizing the note.

## 2025-04-22 NOTE — ASSESSMENT & PLAN NOTE
"Chronic, uncontrolled.  Years.  PHQ9: 14  GAD7: 16  \"Often I feel tired and depressed, I don't want to do anything.\"  Not sleeping well.  Hard time falling and staying asleep.  Went back to [place] for a while to see if it helped but it didn't (was there for 6 months).  Therapy: history of, years ago.  Willing to try again.  Denies SI/HI.  Doesn't enjoy the things he typically enjoys.  Lacks motivation to do things.  Feels tired.  Worries about a lot of things.    Tried:  Venlafaxine: low libido    Interval history 3/2023:  Chronic, uncontrolled.  Started at least a year ago.  Denies SI/HI  PHQ-9: 18  KIKA-7: 14    3/14/2023:  Patient stopped taking venlafaxine 37.5 because he did not feel it was doing anything.  Overall he is feeling a little bit better but did not understand he could increase the dose.  He thinks he will go back on the medication, increasing the dose after 1 week.  "

## 2025-04-29 NOTE — Clinical Note
REFERRAL APPROVAL NOTICE         Sent on April 29, 2025                   Jamie Figueroa  [address] [facility] [place] [address]                   Dear Mr. Gely Figueroa,    After a careful review of the medical information and benefit coverage, [facility] has processed your referral. See below for additional details.    If applicable, you must be actively enrolled with your insurance for coverage of the authorized service. If you have any questions regarding your coverage, please contact your insurance directly.    REFERRAL INFORMATION   Referral #:  [id number]  Referred-To Provider    Referred-By Provider:  Gastroenterology    Dennise Curry P.A.-C.   [facility]      [address]  [place] [id number] [phone] [address] [facility] [place] [address]  [phone]    Referral Start Date:  04/22/2025  Referral End Date:   04/23/2026             SCHEDULING  If you do not already have an appointment, please call [phone] to make an appointment.     MORE INFORMATION  If you do not already have a [employer] account, sign up at: [email or web address]  You can access your medical information, make appointments, see lab results, billing information, and more.  If you have questions regarding this referral, please contact  the [facility] Referrals department at:             [phone]. Monday - Friday 8:00AM - 5:00PM.     Sincerely,    [facility]

## 2025-04-29 NOTE — Clinical Note
REFERRAL APPROVAL NOTICE         Sent on April 29, 2025                   Jamie Figueroa  [address] [facility] [place] [address]                   Dear Mr. Gely Figueroa,    After a careful review of the medical information and benefit coverage, [facility] has processed your referral. See below for additional details.    If applicable, you must be actively enrolled with your insurance for coverage of the authorized service. If you have any questions regarding your coverage, please contact your insurance directly.    REFERRAL INFORMATION   Referral #:  [id number]  Referred-To Department    Referred-By Provider:  Audiology    Dennise Curry P.A.-C.   [facility]      [address]  [facility] [id number] [phone] [address] [facility] [id number] [phone]    Referral Start Date:  04/22/2025  Referral End Date:   04/22/2026             SCHEDULING  If you do not already have an appointment, please call [phone] to make an appointment.     MORE INFORMATION  If you do not already have a [employer] account, sign up at: [email or web address]  You can access your medical information, make appointments, see lab results, billing information, and more.  If you have questions regarding this referral, please contact  the [facility] Referrals department at:             [phone]. Monday - Friday 8:00AM - 5:00PM.     Sincerely,    [facility]

## 2025-04-29 NOTE — Clinical Note
REFERRAL APPROVAL NOTICE         Sent on April 29, 2025                   Jamie Powellr  [address]  [place] [address]                   Dear Mr. Gely Figueroa,    After a careful review of the medical information and benefit coverage, [facility] has processed your referral. See below for additional details.    If applicable, you must be actively enrolled with your insurance for coverage of the authorized service. If you have any questions regarding your coverage, please contact your insurance directly.    REFERRAL INFORMATION   Referral #:  [id number]  Referred-To Provider    Referred-By Provider:  Behavioral Health    Dennise Curry P.A.-C.   [facility]      [address]  [place] [address] [phone] [id number]  [place] [address]  [phone]    Referral Start Date:  04/22/2025  Referral End Date:   04/22/2026             SCHEDULING  If you do not already have an appointment, please call [phone] to make an appointment.     MORE INFORMATION  If you do not already have a [employer] account, sign up at: [email or web address]  You can access your medical information, make appointments, see lab results, billing information, and more.  If you have questions regarding this referral, please contact  the [facility] Referrals department at:             [phone]. Monday - Friday 8:00AM - 5:00PM.     Sincerely,    [facility]

## 2025-04-30 ENCOUNTER — HOSPITAL ENCOUNTER (OUTPATIENT)
Facility: MEDICAL CENTER | Age: 60
End: 2025-04-30
Attending: PHYSICIAN ASSISTANT
Payer: COMMERCIAL

## 2025-04-30 DIAGNOSIS — E55.9 VITAMIN D DEFICIENCY: ICD-10-CM

## 2025-04-30 DIAGNOSIS — Z11.59 NEED FOR HEPATITIS C SCREENING TEST: ICD-10-CM

## 2025-04-30 DIAGNOSIS — Z11.3 SCREENING EXAMINATION FOR SEXUALLY TRANSMITTED DISEASE: ICD-10-CM

## 2025-04-30 DIAGNOSIS — E78.5 DYSLIPIDEMIA: ICD-10-CM

## 2025-04-30 DIAGNOSIS — Z12.5 ENCOUNTER FOR SCREENING FOR MALIGNANT NEOPLASM OF PROSTATE: ICD-10-CM

## 2025-04-30 DIAGNOSIS — R53.83 FATIGUE, UNSPECIFIED TYPE: ICD-10-CM

## 2025-04-30 LAB
25(OH)D3 SERPL-MCNC: 15 NG/ML (ref 30–100)
ALBUMIN SERPL BCP-MCNC: 4.1 G/DL (ref 3.2–4.9)
ALBUMIN/GLOB SERPL: 1.6 G/DL
ALP SERPL-CCNC: 86 U/L (ref 30–99)
ALT SERPL-CCNC: 30 U/L (ref 2–50)
ANION GAP SERPL CALC-SCNC: 9 MMOL/L (ref 7–16)
AST SERPL-CCNC: 24 U/L (ref 12–45)
BASOPHILS # BLD AUTO: 1 % (ref 0–1.8)
BASOPHILS # BLD: 0.05 K/UL (ref 0–0.12)
BILIRUB SERPL-MCNC: 0.5 MG/DL (ref 0.1–1.5)
BUN SERPL-MCNC: 11 MG/DL (ref 8–22)
CALCIUM ALBUM COR SERPL-MCNC: 8.8 MG/DL (ref 8.5–10.5)
CALCIUM SERPL-MCNC: 8.9 MG/DL (ref 8.5–10.5)
CHLORIDE SERPL-SCNC: 101 MMOL/L (ref 96–112)
CHOLEST SERPL-MCNC: 210 MG/DL (ref 100–199)
CO2 SERPL-SCNC: 25 MMOL/L (ref 20–33)
CREAT SERPL-MCNC: 0.85 MG/DL (ref 0.5–1.4)
EOSINOPHIL # BLD AUTO: 0.13 K/UL (ref 0–0.51)
EOSINOPHIL NFR BLD: 2.5 % (ref 0–6.9)
ERYTHROCYTE [DISTWIDTH] IN BLOOD BY AUTOMATED COUNT: 41.5 FL (ref 35.9–50)
FASTING STATUS PATIENT QL REPORTED: NORMAL
GFR SERPLBLD CREATININE-BSD FMLA CKD-EPI: 100 ML/MIN/1.73 M 2
GLOBULIN SER CALC-MCNC: 2.6 G/DL (ref 1.9–3.5)
GLUCOSE SERPL-MCNC: 94 MG/DL (ref 65–99)
HCT VFR BLD AUTO: 47.2 % (ref 42–52)
HCV AB SER QL: NORMAL
HDLC SERPL-MCNC: 38 MG/DL
HGB BLD-MCNC: 16 G/DL (ref 14–18)
HIV 1+2 AB+HIV1 P24 AG SERPL QL IA: NORMAL
IMM GRANULOCYTES # BLD AUTO: 0.01 K/UL (ref 0–0.11)
IMM GRANULOCYTES NFR BLD AUTO: 0.2 % (ref 0–0.9)
LDLC SERPL CALC-MCNC: 141 MG/DL
LYMPHOCYTES # BLD AUTO: 1.86 K/UL (ref 1–4.8)
LYMPHOCYTES NFR BLD: 36 % (ref 22–41)
MCH RBC QN AUTO: 31.4 PG (ref 27–33)
MCHC RBC AUTO-ENTMCNC: 33.9 G/DL (ref 32.3–36.5)
MCV RBC AUTO: 92.7 FL (ref 81.4–97.8)
MONOCYTES # BLD AUTO: 0.45 K/UL (ref 0–0.85)
MONOCYTES NFR BLD AUTO: 8.7 % (ref 0–13.4)
NEUTROPHILS # BLD AUTO: 2.66 K/UL (ref 1.82–7.42)
NEUTROPHILS NFR BLD: 51.6 % (ref 44–72)
NRBC # BLD AUTO: 0 K/UL
NRBC BLD-RTO: 0 /100 WBC (ref 0–0.2)
PLATELET # BLD AUTO: 178 K/UL (ref 164–446)
PMV BLD AUTO: 9.6 FL (ref 9–12.9)
POTASSIUM SERPL-SCNC: 3.9 MMOL/L (ref 3.6–5.5)
PROT SERPL-MCNC: 6.7 G/DL (ref 6–8.2)
PSA SERPL-MCNC: 0.71 NG/ML (ref 0–4)
RBC # BLD AUTO: 5.09 M/UL (ref 4.7–6.1)
SODIUM SERPL-SCNC: 135 MMOL/L (ref 135–145)
TRIGL SERPL-MCNC: 156 MG/DL (ref 0–149)
TSH SERPL DL<=0.005 MIU/L-ACNC: 1.81 UIU/ML (ref 0.38–5.33)
WBC # BLD AUTO: 5.2 K/UL (ref 4.8–10.8)

## 2025-04-30 PROCEDURE — 84153 ASSAY OF PSA TOTAL: CPT

## 2025-04-30 PROCEDURE — 87389 HIV-1 AG W/HIV-1&-2 AB AG IA: CPT

## 2025-04-30 PROCEDURE — 80053 COMPREHEN METABOLIC PANEL: CPT

## 2025-04-30 PROCEDURE — 80061 LIPID PANEL: CPT

## 2025-04-30 PROCEDURE — 86803 HEPATITIS C AB TEST: CPT

## 2025-04-30 PROCEDURE — 85025 COMPLETE CBC W/AUTO DIFF WBC: CPT

## 2025-04-30 PROCEDURE — 36415 COLL VENOUS BLD VENIPUNCTURE: CPT

## 2025-04-30 PROCEDURE — 82306 VITAMIN D 25 HYDROXY: CPT

## 2025-04-30 PROCEDURE — 84443 ASSAY THYROID STIM HORMONE: CPT

## 2025-05-01 ENCOUNTER — RESULTS FOLLOW-UP (OUTPATIENT)
Dept: MEDICAL GROUP | Facility: PHYSICIAN GROUP | Age: 60
End: 2025-05-01

## 2025-05-01 DIAGNOSIS — E78.5 DYSLIPIDEMIA: ICD-10-CM

## 2025-05-02 RX ORDER — ATORVASTATIN CALCIUM 10 MG/1
10 TABLET, FILM COATED ORAL NIGHTLY
Qty: 90 TABLET | Refills: 1 | Status: SHIPPED | OUTPATIENT
Start: 2025-05-02

## 2025-06-20 DIAGNOSIS — F41.8 DEPRESSION WITH ANXIETY: ICD-10-CM

## 2025-06-22 RX ORDER — BUSPIRONE HYDROCHLORIDE 10 MG/1
10 TABLET ORAL 2 TIMES DAILY
Qty: 60 TABLET | Refills: 0 | Status: SHIPPED | OUTPATIENT
Start: 2025-06-22

## 2025-07-17 DIAGNOSIS — F41.8 DEPRESSION WITH ANXIETY: ICD-10-CM

## 2025-07-18 RX ORDER — BUSPIRONE HYDROCHLORIDE 10 MG/1
10 TABLET ORAL 2 TIMES DAILY
Qty: 180 TABLET | Refills: 1 | Status: SHIPPED | OUTPATIENT
Start: 2025-07-18

## 2025-08-20 DIAGNOSIS — F41.8 DEPRESSION WITH ANXIETY: ICD-10-CM

## 2025-08-20 RX ORDER — BUSPIRONE HYDROCHLORIDE 10 MG/1
10 TABLET ORAL 2 TIMES DAILY
Qty: 540 TABLET | Refills: 1 | Status: SHIPPED | OUTPATIENT
Start: 2025-08-20

## (undated) DEVICE — CANNULA W/SEAL 5X100 Z-THRE - ADED KII (12/BX)

## (undated) DEVICE — SET SUCTION/IRRIGATION WITH DISPOSABLE TIP (6/CA )PART #0250-070-520 IS A SUB

## (undated) DEVICE — SET LEADWIRE 5 LEAD BEDSIDE DISPOSABLE ECG (1SET OF 5/EA)

## (undated) DEVICE — GOWN WARMING STANDARD FLEX - (30/CA)

## (undated) DEVICE — TOWEL STOP TIMEOUT SAFETY FLAG (40EA/CA)

## (undated) DEVICE — BAG RETRIEVAL 10ML (10EA/BX)

## (undated) DEVICE — SET EXTENSION WITH 2 PORTS (48EA/CA) ***PART #2C8610 IS A SUBSTITUTE*****

## (undated) DEVICE — TROCAR Z THREAD11MM OPTICAL - NON BLADED(6/BX)

## (undated) DEVICE — SODIUM CHL IRRIGATION 0.9% 1000ML (12EA/CA)

## (undated) DEVICE — SET TUBING PNEUMOCLEAR HIGH FLOW SMOKE EVACUATION (10EA/BX)

## (undated) DEVICE — TUBING CLEARLINK DUO-VENT - C-FLO (48EA/CA)

## (undated) DEVICE — TROCAR LAPSCP 100MM 12MM NTHRD - (6/BX)

## (undated) DEVICE — CLOSURE SKIN STRIP 1/2 X 4 IN - (STERI STRIP) (50/BX 4BX/CA)

## (undated) DEVICE — LACTATED RINGERS INJ 1000 ML - (14EA/CA 60CA/PF)

## (undated) DEVICE — ELECTRODE DUAL RETURN W/ CORD - (50/PK)

## (undated) DEVICE — CANISTER SUCTION 3000ML MECHANICAL FILTER AUTO SHUTOFF MEDI-VAC NONSTERILE LF DISP  (40EA/CA)

## (undated) DEVICE — SUTURE GENERAL

## (undated) DEVICE — CLIP MED LG INTNL HRZN TI ESCP - (20/BX)

## (undated) DEVICE — CHLORAPREP 26 ML APPLICATOR - ORANGE TINT(25/CA)

## (undated) DEVICE — SUCTION INSTRUMENT YANKAUER BULBOUS TIP W/O VENT (50EA/CA)

## (undated) DEVICE — SLEEVE VASO CALF MED - (10PR/CA)

## (undated) DEVICE — DRAPESURG STERI-DRAPE LONG - (10/BX 4BX/CA)

## (undated) DEVICE — GLOVE BIOGEL PI ORTHO SZ 8 PF LF (40PR/BX)

## (undated) DEVICE — SENSOR OXIMETER ADULT SPO2 RD SET (20EA/BX)

## (undated) DEVICE — TROCAR 5X100 NON BLADED Z-TH - READ KII (6/BX)

## (undated) DEVICE — SUTURE 4-0 MONOCRYL PLUS PS-2 - 27 INCH (36/BX)

## (undated) DEVICE — COVER LIGHT HANDLE ALC PLUS DISP (18EA/BX)

## (undated) DEVICE — PACK LAP CHOLE OR - (2EA/CA)

## (undated) DEVICE — SUTURE 0 VICRYL PLUS UR-6 - 27 INCH (36/BX)

## (undated) DEVICE — GLOVE BIOGEL SZ 8 SURGICAL PF LTX - (50PR/BX 4BX/CA)

## (undated) DEVICE — DRESSING SURGICAL NUKNIT 6X9 (10EA/BX)